# Patient Record
Sex: FEMALE | Race: BLACK OR AFRICAN AMERICAN | URBAN - METROPOLITAN AREA
[De-identification: names, ages, dates, MRNs, and addresses within clinical notes are randomized per-mention and may not be internally consistent; named-entity substitution may affect disease eponyms.]

---

## 2024-05-13 ENCOUNTER — OFFICE VISIT (OUTPATIENT)
Dept: ENT CLINIC | Age: 39
End: 2024-05-13
Payer: COMMERCIAL

## 2024-05-13 VITALS
HEIGHT: 67 IN | WEIGHT: 189 LBS | OXYGEN SATURATION: 98 % | BODY MASS INDEX: 29.66 KG/M2 | DIASTOLIC BLOOD PRESSURE: 77 MMHG | SYSTOLIC BLOOD PRESSURE: 112 MMHG | RESPIRATION RATE: 16 BRPM

## 2024-05-13 DIAGNOSIS — K11.8 PAROTID MASS: Primary | ICD-10-CM

## 2024-05-13 PROCEDURE — 99204 OFFICE O/P NEW MOD 45 MIN: CPT | Performed by: OTOLARYNGOLOGY

## 2024-05-13 PROCEDURE — 1036F TOBACCO NON-USER: CPT | Performed by: OTOLARYNGOLOGY

## 2024-05-13 PROCEDURE — G8427 DOCREV CUR MEDS BY ELIG CLIN: HCPCS | Performed by: OTOLARYNGOLOGY

## 2024-05-13 PROCEDURE — G8419 CALC BMI OUT NRM PARAM NOF/U: HCPCS | Performed by: OTOLARYNGOLOGY

## 2024-05-13 RX ORDER — CLINDAMYCIN HYDROCHLORIDE 300 MG/1
300 CAPSULE ORAL 3 TIMES DAILY
Qty: 21 CAPSULE | Refills: 0 | Status: SHIPPED | OUTPATIENT
Start: 2024-05-13 | End: 2024-05-20

## 2024-05-23 ENCOUNTER — HOSPITAL ENCOUNTER (OUTPATIENT)
Dept: ULTRASOUND IMAGING | Age: 39
Discharge: HOME | End: 2024-05-23
Attending: OTOLARYNGOLOGY
Payer: COMMERCIAL

## 2024-05-23 DIAGNOSIS — K11.8 PAROTID MASS: ICD-10-CM

## 2024-05-23 PROCEDURE — 88305 TISSUE EXAM BY PATHOLOGIST: CPT

## 2024-05-23 PROCEDURE — 76942 ECHO GUIDE FOR BIOPSY: CPT

## 2024-05-30 ENCOUNTER — TELEPHONE (OUTPATIENT)
Dept: ENT CLINIC | Age: 39
End: 2024-05-30

## 2024-05-30 NOTE — TELEPHONE ENCOUNTER
I called the patient to let her know that the FNA of the right facial lymph node was nondiagnostic.  I feel as though given the fact that its gotten smaller is probably just a lymph node that got inflamed.  I think at this time I would just want to see her in 3 months to make sure there is no changes.  Patient agrees.  I will see her then.

## 2024-05-31 ENCOUNTER — TELEPHONE (OUTPATIENT)
Dept: ENT CLINIC | Age: 39
End: 2024-05-31

## 2024-05-31 NOTE — TELEPHONE ENCOUNTER
----- Message from Benjamin Garcia MD sent at 5/30/2024 10:02 AM EDT -----  Regarding: follow up  I see this patient in 3 months for a follow-up visit.

## 2025-01-03 ENCOUNTER — APPOINTMENT (OUTPATIENT)
Dept: CT IMAGING | Age: 40
End: 2025-01-03

## 2025-01-03 ENCOUNTER — HOSPITAL ENCOUNTER (EMERGENCY)
Age: 40
Discharge: HOME | End: 2025-01-03

## 2025-01-03 VITALS
RESPIRATION RATE: 17 BRPM | SYSTOLIC BLOOD PRESSURE: 139 MMHG | TEMPERATURE: 98.1 F | DIASTOLIC BLOOD PRESSURE: 87 MMHG | HEART RATE: 51 BPM | OXYGEN SATURATION: 99 % | BODY MASS INDEX: 30.9 KG/M2 | WEIGHT: 197.31 LBS

## 2025-01-03 DIAGNOSIS — N20.1 URETEROLITHIASIS: Primary | ICD-10-CM

## 2025-01-03 LAB
ALBUMIN SERPL-MCNC: 4.5 G/DL (ref 3.4–5)
ALBUMIN/GLOB SERPL: 1.3 {RATIO} (ref 1.1–2.2)
ALP SERPL-CCNC: 54 U/L (ref 40–129)
ALT SERPL-CCNC: 33 U/L (ref 10–40)
AMPHETAMINES UR QL SCN>1000 NG/ML: ABNORMAL
ANION GAP SERPL CALCULATED.3IONS-SCNC: 15 MMOL/L (ref 3–16)
AST SERPL-CCNC: 25 U/L (ref 15–37)
BACTERIA URNS QL MICRO: ABNORMAL /HPF
BARBITURATES UR QL SCN>200 NG/ML: ABNORMAL
BASOPHILS # BLD: 0.1 K/UL (ref 0–0.2)
BASOPHILS NFR BLD: 0.9 %
BENZODIAZ UR QL SCN>200 NG/ML: ABNORMAL
BILIRUB SERPL-MCNC: 0.4 MG/DL (ref 0–1)
BILIRUB UR QL STRIP.AUTO: NEGATIVE
BUN SERPL-MCNC: 12 MG/DL (ref 7–20)
CALCIUM SERPL-MCNC: 9.7 MG/DL (ref 8.3–10.6)
CANNABINOIDS UR QL SCN>50 NG/ML: POSITIVE
CHARACTER UR: ABNORMAL
CHLORIDE SERPL-SCNC: 104 MMOL/L (ref 99–110)
CLARITY UR: ABNORMAL
CO2 SERPL-SCNC: 21 MMOL/L (ref 21–32)
COCAINE UR QL SCN: ABNORMAL
COLOR UR: YELLOW
CREAT SERPL-MCNC: 0.8 MG/DL (ref 0.6–1.1)
DEPRECATED RDW RBC AUTO: 14.7 % (ref 12.4–15.4)
DRUG SCREEN COMMENT UR-IMP: ABNORMAL
EKG ATRIAL RATE: 53 BPM
EKG DIAGNOSIS: NORMAL
EKG P-R INTERVAL: 128 MS
EKG Q-T INTERVAL: 480 MS
EKG QRS DURATION: 88 MS
EKG QTC CALCULATION (BAZETT): 450 MS
EKG R AXIS: 13 DEGREES
EKG T AXIS: 20 DEGREES
EKG VENTRICULAR RATE: 53 BPM
EOSINOPHIL # BLD: 0.1 K/UL (ref 0–0.6)
EOSINOPHIL NFR BLD: 1.1 %
EPI CELLS #/AREA URNS AUTO: 2 /HPF (ref 0–5)
FENTANYL SCREEN, URINE: ABNORMAL
GFR SERPLBLD CREATININE-BSD FMLA CKD-EPI: >90 ML/MIN/{1.73_M2}
GLUCOSE SERPL-MCNC: 136 MG/DL (ref 70–99)
GLUCOSE UR STRIP.AUTO-MCNC: NEGATIVE MG/DL
HCT VFR BLD AUTO: 42.2 % (ref 36–48)
HGB BLD-MCNC: 13.8 G/DL (ref 12–16)
HGB UR QL STRIP.AUTO: ABNORMAL
HYALINE CASTS #/AREA URNS AUTO: 0 /LPF (ref 0–8)
KETONES UR STRIP.AUTO-MCNC: ABNORMAL MG/DL
LEUKOCYTE ESTERASE UR QL STRIP.AUTO: ABNORMAL
LIPASE SERPL-CCNC: 15 U/L (ref 13–60)
LYMPHOCYTES # BLD: 2.7 K/UL (ref 1–5.1)
LYMPHOCYTES NFR BLD: 29.1 %
MAGNESIUM SERPL-MCNC: 1.82 MG/DL (ref 1.8–2.4)
MCH RBC QN AUTO: 27.7 PG (ref 26–34)
MCHC RBC AUTO-ENTMCNC: 32.6 G/DL (ref 31–36)
MCV RBC AUTO: 84.8 FL (ref 80–100)
METHADONE UR QL SCN>300 NG/ML: ABNORMAL
MONOCYTES # BLD: 0.5 K/UL (ref 0–1.3)
MONOCYTES NFR BLD: 5 %
NEUTROPHILS # BLD: 6 K/UL (ref 1.7–7.7)
NEUTROPHILS NFR BLD: 63.9 %
NITRITE UR QL STRIP.AUTO: NEGATIVE
OPIATES UR QL SCN>300 NG/ML: ABNORMAL
OXYCODONE UR QL SCN: ABNORMAL
PCP UR QL SCN>25 NG/ML: ABNORMAL
PH UR STRIP.AUTO: 8.5 [PH] (ref 5–8)
PH UR STRIP: 8 [PH]
PLATELET # BLD AUTO: 288 K/UL (ref 135–450)
PLATELET BLD QL SMEAR: ADEQUATE
PMV BLD AUTO: 9.3 FL (ref 5–10.5)
POTASSIUM SERPL-SCNC: 3.5 MMOL/L (ref 3.5–5.1)
PROT SERPL-MCNC: 7.9 G/DL (ref 6.4–8.2)
PROT UR STRIP.AUTO-MCNC: ABNORMAL MG/DL
RBC # BLD AUTO: 4.97 M/UL (ref 4–5.2)
RBC #/AREA URNS HPF: ABNORMAL /HPF (ref 0–4)
SLIDE REVIEW: NORMAL
SODIUM SERPL-SCNC: 140 MMOL/L (ref 136–145)
SP GR UR STRIP.AUTO: 1.02 (ref 1–1.03)
UA COMPLETE W REFLEX CULTURE PNL UR: ABNORMAL
UA DIPSTICK W REFLEX MICRO PNL UR: YES
URN SPEC COLLECT METH UR: ABNORMAL
UROBILINOGEN UR STRIP-ACNC: 0.2 E.U./DL
WBC # BLD AUTO: 9.4 K/UL (ref 4–11)
WBC #/AREA URNS AUTO: 3 /HPF (ref 0–5)

## 2025-01-03 PROCEDURE — 83690 ASSAY OF LIPASE: CPT

## 2025-01-03 PROCEDURE — 96374 THER/PROPH/DIAG INJ IV PUSH: CPT

## 2025-01-03 PROCEDURE — 6370000000 HC RX 637 (ALT 250 FOR IP): Performed by: PHYSICIAN ASSISTANT

## 2025-01-03 PROCEDURE — 99285 EMERGENCY DEPT VISIT HI MDM: CPT

## 2025-01-03 PROCEDURE — 80307 DRUG TEST PRSMV CHEM ANLYZR: CPT

## 2025-01-03 PROCEDURE — 6360000004 HC RX CONTRAST MEDICATION: Performed by: PHYSICIAN ASSISTANT

## 2025-01-03 PROCEDURE — 83735 ASSAY OF MAGNESIUM: CPT

## 2025-01-03 PROCEDURE — 93005 ELECTROCARDIOGRAM TRACING: CPT | Performed by: EMERGENCY MEDICINE

## 2025-01-03 PROCEDURE — 2580000003 HC RX 258: Performed by: PHYSICIAN ASSISTANT

## 2025-01-03 PROCEDURE — 6360000002 HC RX W HCPCS: Performed by: PHYSICIAN ASSISTANT

## 2025-01-03 PROCEDURE — 81001 URINALYSIS AUTO W/SCOPE: CPT

## 2025-01-03 PROCEDURE — 85025 COMPLETE CBC W/AUTO DIFF WBC: CPT

## 2025-01-03 PROCEDURE — 80053 COMPREHEN METABOLIC PANEL: CPT

## 2025-01-03 PROCEDURE — 96375 TX/PRO/DX INJ NEW DRUG ADDON: CPT

## 2025-01-03 PROCEDURE — 74177 CT ABD & PELVIS W/CONTRAST: CPT

## 2025-01-03 PROCEDURE — 93010 ELECTROCARDIOGRAM REPORT: CPT | Performed by: INTERNAL MEDICINE

## 2025-01-03 RX ORDER — ONDANSETRON 4 MG/1
4 TABLET, ORALLY DISINTEGRATING ORAL EVERY 8 HOURS PRN
Qty: 10 TABLET | Refills: 0 | Status: SHIPPED | OUTPATIENT
Start: 2025-01-03

## 2025-01-03 RX ORDER — IBUPROFEN 800 MG/1
800 TABLET, FILM COATED ORAL EVERY 8 HOURS PRN
Qty: 20 TABLET | Refills: 0 | Status: SHIPPED | OUTPATIENT
Start: 2025-01-03

## 2025-01-03 RX ORDER — 0.9 % SODIUM CHLORIDE 0.9 %
1000 INTRAVENOUS SOLUTION INTRAVENOUS ONCE
Status: COMPLETED | OUTPATIENT
Start: 2025-01-03 | End: 2025-01-03

## 2025-01-03 RX ORDER — KETOROLAC TROMETHAMINE 15 MG/ML
15 INJECTION, SOLUTION INTRAMUSCULAR; INTRAVENOUS ONCE
Status: COMPLETED | OUTPATIENT
Start: 2025-01-03 | End: 2025-01-03

## 2025-01-03 RX ORDER — TAMSULOSIN HYDROCHLORIDE 0.4 MG/1
0.4 CAPSULE ORAL ONCE
Status: COMPLETED | OUTPATIENT
Start: 2025-01-03 | End: 2025-01-03

## 2025-01-03 RX ORDER — OXYCODONE AND ACETAMINOPHEN 5; 325 MG/1; MG/1
2 TABLET ORAL ONCE
Status: COMPLETED | OUTPATIENT
Start: 2025-01-03 | End: 2025-01-03

## 2025-01-03 RX ORDER — MORPHINE SULFATE 4 MG/ML
4 INJECTION, SOLUTION INTRAMUSCULAR; INTRAVENOUS ONCE
Status: COMPLETED | OUTPATIENT
Start: 2025-01-03 | End: 2025-01-03

## 2025-01-03 RX ORDER — IOPAMIDOL 755 MG/ML
75 INJECTION, SOLUTION INTRAVASCULAR
Status: COMPLETED | OUTPATIENT
Start: 2025-01-03 | End: 2025-01-03

## 2025-01-03 RX ORDER — DROPERIDOL 2.5 MG/ML
1.25 INJECTION, SOLUTION INTRAMUSCULAR; INTRAVENOUS ONCE
Status: COMPLETED | OUTPATIENT
Start: 2025-01-03 | End: 2025-01-03

## 2025-01-03 RX ORDER — OXYCODONE AND ACETAMINOPHEN 5; 325 MG/1; MG/1
1 TABLET ORAL EVERY 6 HOURS PRN
Qty: 12 TABLET | Refills: 0 | Status: SHIPPED | OUTPATIENT
Start: 2025-01-03 | End: 2025-01-06

## 2025-01-03 RX ORDER — TAMSULOSIN HYDROCHLORIDE 0.4 MG/1
0.4 CAPSULE ORAL NIGHTLY
Qty: 7 CAPSULE | Refills: 0 | Status: SHIPPED | OUTPATIENT
Start: 2025-01-03

## 2025-01-03 RX ADMIN — OXYCODONE HYDROCHLORIDE AND ACETAMINOPHEN 2 TABLET: 5; 325 TABLET ORAL at 15:14

## 2025-01-03 RX ADMIN — DROPERIDOL 1.25 MG: 2.5 INJECTION, SOLUTION INTRAMUSCULAR; INTRAVENOUS at 12:30

## 2025-01-03 RX ADMIN — KETOROLAC TROMETHAMINE 15 MG: 15 INJECTION, SOLUTION INTRAMUSCULAR; INTRAVENOUS at 12:30

## 2025-01-03 RX ADMIN — TAMSULOSIN HYDROCHLORIDE 0.4 MG: 0.4 CAPSULE ORAL at 15:14

## 2025-01-03 RX ADMIN — SODIUM CHLORIDE 1000 ML: 9 INJECTION, SOLUTION INTRAVENOUS at 12:31

## 2025-01-03 RX ADMIN — MORPHINE SULFATE 4 MG: 4 INJECTION, SOLUTION INTRAMUSCULAR; INTRAVENOUS at 14:09

## 2025-01-03 RX ADMIN — IOPAMIDOL 75 ML: 755 INJECTION, SOLUTION INTRAVENOUS at 13:00

## 2025-01-03 ASSESSMENT — PAIN - FUNCTIONAL ASSESSMENT
PAIN_FUNCTIONAL_ASSESSMENT: NONE - DENIES PAIN
PAIN_FUNCTIONAL_ASSESSMENT: 0-10

## 2025-01-03 ASSESSMENT — PAIN DESCRIPTION - LOCATION
LOCATION: ABDOMEN
LOCATION: ABDOMEN

## 2025-01-03 ASSESSMENT — PAIN DESCRIPTION - DESCRIPTORS
DESCRIPTORS: ACHING
DESCRIPTORS: PATIENT UNABLE TO DESCRIBE

## 2025-01-03 ASSESSMENT — PAIN DESCRIPTION - ORIENTATION: ORIENTATION: LOWER

## 2025-01-03 ASSESSMENT — PAIN SCALES - GENERAL
PAINLEVEL_OUTOF10: 8
PAINLEVEL_OUTOF10: 10
PAINLEVEL_OUTOF10: 10

## 2025-01-03 ASSESSMENT — PAIN DESCRIPTION - PAIN TYPE: TYPE: ACUTE PAIN

## 2025-01-03 NOTE — ED NOTES
Tech informed RN he went in to patient room and patient states \"my iv is coming out, see?\" And states he watched patient at that moment pull back RN's securing dressing and original dressing and pull iv out.

## 2025-01-03 NOTE — ED PROVIDER NOTES
The Ekg interpreted by me shows  Sinus bradycardia with a rate of 53  Axis is normal   QTc is acceptable at 450  Intervals and Durations are unremarkable.      ST Segments: nonspecific changes. No prior EKG available for comparison    I did not see or evaluate this patient. This is ekg interpretation only.         Kelle Santos MD  01/03/25 5779

## 2025-01-03 NOTE — ED PROVIDER NOTES
[facility] EMERGENCY DEPARTMENT  EMERGENCY DEPARTMENT ENCOUNTER        Pt Name: Argentina Villagomez  MRN: [id number]  Birthdate 1985  Date of evaluation: 1/3/2025  Provider: Lindsey Ware PA-C  PCP: Alberta Oh APRN - NP  Note Started: 11:20 AM EST 1/3/25      ARON. I have evaluated this patient.        CHIEF COMPLAINT       Chief Complaint   Patient presents with    Abdominal Pain     C/o abd pain that shoots around to her back with nausea, green emesis. Denies diarrhea.        HISTORY OF PRESENT ILLNESS: 1 or more Elements     History From: patient, limited hx from patient due to her distress due to pain    Argentina Villagomez is a 39 y.o. female who presents for abdominal pain, nausea and vomiting.  Pain located throughout her abdomen.  Has associated nausea and vomiting.  Denies hematemesis or coffee ground emesis.  Denies diarrhea.  No prior episodes of pain.  Smokes MJ daily.  No other drugs.  No hx of cannabinoid hyperemesis.    Nursing Notes were reviewed and agreed with or any disagreements were addressed in the HPI.    REVIEW OF SYSTEMS :      Review of Systems    Positives and Pertinent negatives as per HPI.     SURGICAL HISTORY     Past Surgical History:   Procedure Laterality Date     SECTION  ,     HYSTERECTOMY (CERVIX STATUS UNKNOWN)      LAPAROTOMY  2009    \"mass\"    TONSILLECTOMY  1988     BIOPSY OF SALIVARY GLAND  2024     BIOPSY OF SALIVARY GLAND 2024 Vicki Solorio, APRN - CNP [facility] ULTRASOUND       CURRENTMEDICATIONS       Discharge Medication List as of 1/3/2025  3:03 PM          ALLERGIES     Pcn [penicillins], Ambien [zolpidem], and Amoxicillin    FAMILYHISTORY     History reviewed. No pertinent family history.     SOCIAL HISTORY       Social History     Tobacco Use    Smoking status: Former     Current packs/day: 0.30     Average packs/day: 0.3 packs/day for 1 year (0.3 ttl pk-yrs)     Types: Cigarettes

## 2025-01-03 NOTE — ED NOTES
RN unavailable but could hear patient screaming, patient was not screaming when brought to room, walking to bathroom and back or while talking with provider, patient started screaming her iv was coming out. Rn went to room and explained with a critical patient and I understand she is in pain but unable to be in room and will be there as soon as I can. Rn secured iv with another dressing and stated I will check on it as soon as I can get in. Patient states she is going to continue to scream.

## 2025-04-11 ENCOUNTER — HOSPITAL ENCOUNTER (EMERGENCY)
Age: 40
Discharge: HOME | End: 2025-04-11
Attending: STUDENT IN AN ORGANIZED HEALTH CARE EDUCATION/TRAINING PROGRAM
Payer: COMMERCIAL

## 2025-04-11 ENCOUNTER — APPOINTMENT (OUTPATIENT)
Dept: GENERAL RADIOLOGY | Age: 40
End: 2025-04-11
Payer: COMMERCIAL

## 2025-04-11 VITALS
DIASTOLIC BLOOD PRESSURE: 70 MMHG | HEART RATE: 52 BPM | RESPIRATION RATE: 18 BRPM | OXYGEN SATURATION: 100 % | TEMPERATURE: 97.8 F | SYSTOLIC BLOOD PRESSURE: 156 MMHG | BODY MASS INDEX: 29.76 KG/M2 | WEIGHT: 190 LBS

## 2025-04-11 DIAGNOSIS — S86.812A RUPTURE OF LEFT PATELLAR TENDON, INITIAL ENCOUNTER: Primary | ICD-10-CM

## 2025-04-11 PROCEDURE — 99284 EMERGENCY DEPT VISIT MOD MDM: CPT

## 2025-04-11 PROCEDURE — 96372 THER/PROPH/DIAG INJ SC/IM: CPT

## 2025-04-11 PROCEDURE — 6360000002 HC RX W HCPCS: Performed by: STUDENT IN AN ORGANIZED HEALTH CARE EDUCATION/TRAINING PROGRAM

## 2025-04-11 PROCEDURE — 73562 X-RAY EXAM OF KNEE 3: CPT

## 2025-04-11 RX ORDER — KETOROLAC TROMETHAMINE 15 MG/ML
15 INJECTION, SOLUTION INTRAMUSCULAR; INTRAVENOUS ONCE
Status: COMPLETED | OUTPATIENT
Start: 2025-04-11 | End: 2025-04-11

## 2025-04-11 RX ORDER — MELOXICAM 15 MG/1
15 TABLET ORAL DAILY
Qty: 7 TABLET | Refills: 0 | Status: ON HOLD | OUTPATIENT
Start: 2025-04-11

## 2025-04-11 RX ADMIN — KETOROLAC TROMETHAMINE 15 MG: 15 INJECTION, SOLUTION INTRAMUSCULAR; INTRAVENOUS at 18:30

## 2025-04-11 ASSESSMENT — PAIN SCALES - GENERAL: PAINLEVEL_OUTOF10: 10

## 2025-04-11 ASSESSMENT — PAIN DESCRIPTION - DESCRIPTORS: DESCRIPTORS: ACHING

## 2025-04-11 ASSESSMENT — PAIN DESCRIPTION - LOCATION: LOCATION: KNEE

## 2025-04-11 ASSESSMENT — PAIN - FUNCTIONAL ASSESSMENT: PAIN_FUNCTIONAL_ASSESSMENT: 0-10

## 2025-04-11 ASSESSMENT — PAIN DESCRIPTION - ORIENTATION: ORIENTATION: LEFT

## 2025-04-11 NOTE — ED PROVIDER NOTES
judgement.        DIAGNOSTIC RESULTS     RADIOLOGY   Interpretation per the Radiologist below, if available at the time of this note:  XR KNEE LEFT (3 VIEWS)  Result Date: 4/11/2025  XR KNEE LEFT (3 VIEWS) REASON FOR EXAM: Fall going down stairs w/ medial pain and popping sensation COMPARISON: March 8, 2024 FINDINGS: AP, lateral, and sunrise views of the demonstrate a high riding patella, more prominent than prior study from 2024. No fracture or dislocation. No joint effusion.     High riding patella, suspicious for potential patellar tendon injury. Correlate with physical exam. Electronically signed by Boris Bonds MD      ED BEDSIDE ULTRASOUND:   Performed by ED Physician -none    LABS:  Labs Reviewed - No data to display     All other labs were within normal range or not returned as of this dictation.    EMERGENCY DEPARTMENT COURSE and DIFFERENTIAL DIAGNOSIS/MDM:   Vitals:    Vitals:    04/11/25 1800   BP: (!) 156/70   Pulse: 52   Resp: 18   Temp: 97.8 °F (36.6 °C)   TempSrc: Oral   SpO2: 100%   Weight: 86.2 kg (190 lb)       The patient is a 39 y.o. female who presented with a chief complaint of knee pain as above. The differential diagnosis associated with this patient's presentation includes but is not limited to ligamentous injury, patellar fracture or dislocation, tendon injury, considered knee dislocation however not consistent with exam findings. Our workup consisted of ordering/reviewing x-ray of the left knee which confirmed high riding patella consistent with exam findings of elevated patella on exam and inability to extend her knee consistent with patellar tendon rupture.  Patient will be placed in a knee immobilizer and require close outpatient follow-up with orthopedic surgery.  She will be nonweightbearing and provided crutches.    Medications provided in the ED and prescribed at discharge are listed below.     ED Course as of 04/11/25 1919 Fri Apr 11, 2025 1859 All findings discussed with the  patient at bedside, all questions answered and concerns addressed.  On reevaluation patient is sitting up in bed in no acute distress she has even unlabored respirations.  She states no new symptoms.  I discussed the need for knee immobilizer and close follow-up with orthopedic surgery as she will likely require surgical correction of her patellar tendon rupture.  Patient stated understanding and understanding of nonweightbearing status. [DG]      ED Course User Index  [DG] Larry Chisholm MD           ED Medications managed:  Medications   ketorolac (TORADOL) injection 15 mg (15 mg IntraMUSCular Given 4/11/25 6730)       PROCEDURES:  Unless otherwise noted below, none.     Procedures    FINAL IMPRESSION      1. Rupture of left patellar tendon, initial encounter          DISPOSITION    Discharge - Pending Orders Complete 04/11/2025 07:14:33 PM      PATIENT REFERRED TO:  Marcus Hsu MD  [address], [address]  [place] [id number] [phone]    Schedule an appointment as soon as possible for a visit       [facility] Emergency Department  [address] [place] [address]  [phone]    If symptoms worsen      DISCHARGE MEDICATIONS:  New Prescriptions    MELOXICAM (MOBIC) 15 MG TABLET    Take 1 tablet by mouth daily        (Comment: Please note this report has been produced using speech recognition software and may contain errors related to that system including errors in grammar, punctuation, and spelling, as well as words and phrases that may be inappropriate.  If there are any questions or concerns please feel free to contact the dictating provider for clarification.)    LARRY CHISHOLM MD (electronically signed)  Emergency Medicine Provider        Larry Chisholm MD  04/11/25 4052

## 2025-04-11 NOTE — DISCHARGE INSTRUCTIONS
Thank you for trusting us with your care, it was nice to meet you, as we discussed leave the knee immobilizer on until you are seen by orthopedic surgery.  Do not bear weight on your left leg.  Use the crutches as instructed.  Call first thing Monday to schedule an appointment as soon as possible with orthopedic surgery.  Return to the emergency department if you lose sensation or have numbness in your leg, or other symptoms you find concerning for emergent illness or injury.

## 2025-04-12 NOTE — ED NOTES
Pt ambulatory out of ER;   Unable to sit in wheelchair due to knee immobilizer; No leg support on wheelchairs.   Pt ambulatory with help by friends/family.

## 2025-04-14 ENCOUNTER — HOSPITAL ENCOUNTER (EMERGENCY)
Age: 40
Discharge: ANOTHER HOSPITAL | End: 2025-04-14
Attending: STUDENT IN AN ORGANIZED HEALTH CARE EDUCATION/TRAINING PROGRAM
Payer: COMMERCIAL

## 2025-04-14 ENCOUNTER — APPOINTMENT (OUTPATIENT)
Age: 40
End: 2025-04-14
Attending: ORTHOPAEDIC SURGERY
Payer: COMMERCIAL

## 2025-04-14 ENCOUNTER — HOSPITAL ENCOUNTER (INPATIENT)
Age: 40
LOS: 11 days | Discharge: NURSING, REHAB OR LONG-TERM CARE | End: 2025-04-25
Attending: ORTHOPAEDIC SURGERY | Admitting: ORTHOPAEDIC SURGERY
Payer: COMMERCIAL

## 2025-04-14 ENCOUNTER — ANESTHESIA EVENT (OUTPATIENT)
Age: 40
End: 2025-04-14
Payer: COMMERCIAL

## 2025-04-14 ENCOUNTER — TELEPHONE (OUTPATIENT)
Age: 40
End: 2025-04-14

## 2025-04-14 VITALS
HEART RATE: 74 BPM | SYSTOLIC BLOOD PRESSURE: 163 MMHG | RESPIRATION RATE: 19 BRPM | DIASTOLIC BLOOD PRESSURE: 99 MMHG | TEMPERATURE: 98.5 F | OXYGEN SATURATION: 97 %

## 2025-04-14 DIAGNOSIS — S86.812A PATELLAR TENDON RUPTURE, LEFT, INITIAL ENCOUNTER: Primary | ICD-10-CM

## 2025-04-14 DIAGNOSIS — S86.812A RUPTURE OF LEFT PATELLAR TENDON, INITIAL ENCOUNTER: Primary | ICD-10-CM

## 2025-04-14 LAB
ALBUMIN SERPL-MCNC: 4.2 G/DL (ref 3.4–5)
ALBUMIN/GLOB SERPL: 1.3 {RATIO} (ref 1.1–2.2)
ALP SERPL-CCNC: 54 U/L (ref 40–129)
ALT SERPL-CCNC: 22 U/L (ref 10–40)
ANION GAP SERPL CALCULATED.3IONS-SCNC: 11 MMOL/L (ref 3–16)
ANION GAP SERPL CALCULATED.3IONS-SCNC: 11 MMOL/L (ref 3–16)
APTT BLD: 29 SEC (ref 22.1–36.4)
AST SERPL-CCNC: 18 U/L (ref 15–37)
BASOPHILS # BLD: 0.03 K/UL (ref 0–0.2)
BASOPHILS # BLD: 0.1 K/UL (ref 0–0.2)
BASOPHILS NFR BLD: 0 %
BASOPHILS NFR BLD: 1.1 %
BILIRUB SERPL-MCNC: 0.3 MG/DL (ref 0–1)
BUN SERPL-MCNC: 13 MG/DL (ref 7–20)
BUN SERPL-MCNC: 15 MG/DL (ref 7–20)
CALCIUM SERPL-MCNC: 8.8 MG/DL (ref 8.3–10.6)
CALCIUM SERPL-MCNC: 9.4 MG/DL (ref 8.3–10.6)
CHLORIDE SERPL-SCNC: 105 MMOL/L (ref 99–110)
CHLORIDE SERPL-SCNC: 106 MMOL/L (ref 99–110)
CO2 SERPL-SCNC: 22 MMOL/L (ref 21–32)
CO2 SERPL-SCNC: 23 MMOL/L (ref 21–32)
CREAT SERPL-MCNC: 0.6 MG/DL (ref 0.5–1)
CREAT SERPL-MCNC: 0.6 MG/DL (ref 0.6–1.1)
DEPRECATED RDW RBC AUTO: 14.7 % (ref 12.4–15.4)
EOSINOPHIL # BLD: 0.1 K/UL (ref 0–0.6)
EOSINOPHIL # BLD: 0.15 K/UL (ref 0–0.6)
EOSINOPHIL NFR BLD: 1.8 %
EOSINOPHILS RELATIVE PERCENT: 2 %
ERYTHROCYTE [DISTWIDTH] IN BLOOD BY AUTOMATED COUNT: 14.1 % (ref 12.4–15.4)
GFR SERPLBLD CREATININE-BSD FMLA CKD-EPI: >90 ML/MIN/{1.73_M2}
GFR, ESTIMATED: >90 ML/MIN/1.73M2
GLUCOSE SERPL-MCNC: 133 MG/DL (ref 70–99)
GLUCOSE SERPL-MCNC: 91 MG/DL (ref 70–99)
HCT VFR BLD AUTO: 37.6 % (ref 36–48)
HCT VFR BLD AUTO: 41.1 % (ref 36–48)
HGB BLD-MCNC: 12.5 G/DL (ref 12–16)
HGB BLD-MCNC: 13.4 G/DL (ref 12–16)
IMM GRANULOCYTES # BLD AUTO: 0.01 K/UL (ref 0–0.5)
IMM GRANULOCYTES NFR BLD: 0 %
INR PPP: 0.97 (ref 0.85–1.15)
LYMPHOCYTES # BLD: 2.7 K/UL (ref 1–5.1)
LYMPHOCYTES NFR BLD: 3.79 K/UL (ref 1–5.1)
LYMPHOCYTES NFR BLD: 38.8 %
LYMPHOCYTES RELATIVE PERCENT: 45 %
MCH RBC QN AUTO: 27.3 PG (ref 26–34)
MCH RBC QN AUTO: 27.6 PG (ref 26–34)
MCHC RBC AUTO-ENTMCNC: 32.6 G/DL (ref 31–36)
MCHC RBC AUTO-ENTMCNC: 33.2 G/DL (ref 31–36)
MCV RBC AUTO: 82.1 FL (ref 80–100)
MCV RBC AUTO: 84.5 FL (ref 80–100)
MONOCYTES # BLD: 0.4 K/UL (ref 0–1.3)
MONOCYTES NFR BLD: 0.64 K/UL (ref 0–1.3)
MONOCYTES NFR BLD: 6.2 %
MONOCYTES NFR BLD: 8 %
NEUTROPHILS # BLD: 3.6 K/UL (ref 1.7–7.7)
NEUTROPHILS NFR BLD: 45 %
NEUTROPHILS NFR BLD: 52.1 %
NEUTS SEG NFR BLD: 3.81 K/UL (ref 1.7–7.7)
PLATELET # BLD AUTO: 251 K/UL (ref 135–450)
PLATELET # BLD AUTO: 267 K/UL (ref 135–450)
PMV BLD AUTO: 10.2 FL
PMV BLD AUTO: 9.1 FL (ref 5–10.5)
POTASSIUM SERPL-SCNC: 3.9 MMOL/L (ref 3.5–5.1)
POTASSIUM SERPL-SCNC: 4 MMOL/L (ref 3.5–5.1)
PROT SERPL-MCNC: 7.4 G/DL (ref 6.4–8.2)
PROTHROMBIN TIME: 13.1 SEC (ref 11.9–14.9)
RBC # BLD AUTO: 4.58 M/UL (ref 4–5.2)
RBC # BLD AUTO: 4.87 M/UL (ref 4–5.2)
SODIUM SERPL-SCNC: 139 MMOL/L (ref 136–145)
SODIUM SERPL-SCNC: 139 MMOL/L (ref 136–145)
WBC # BLD AUTO: 7 K/UL (ref 4–11)
WBC OTHER # BLD: 8.4 K/UL (ref 4–11)

## 2025-04-14 PROCEDURE — 99223 1ST HOSP IP/OBS HIGH 75: CPT | Performed by: ORTHOPAEDIC SURGERY

## 2025-04-14 PROCEDURE — 96374 THER/PROPH/DIAG INJ IV PUSH: CPT

## 2025-04-14 PROCEDURE — 85025 COMPLETE CBC W/AUTO DIFF WBC: CPT

## 2025-04-14 PROCEDURE — 85610 PROTHROMBIN TIME: CPT

## 2025-04-14 PROCEDURE — 6370000000 HC RX 637 (ALT 250 FOR IP): Performed by: ORTHOPAEDIC SURGERY

## 2025-04-14 PROCEDURE — 1200000000 HC SEMI PRIVATE

## 2025-04-14 PROCEDURE — 6360000002 HC RX W HCPCS: Performed by: ORTHOPAEDIC SURGERY

## 2025-04-14 PROCEDURE — 36415 COLL VENOUS BLD VENIPUNCTURE: CPT

## 2025-04-14 PROCEDURE — 6360000002 HC RX W HCPCS: Performed by: STUDENT IN AN ORGANIZED HEALTH CARE EDUCATION/TRAINING PROGRAM

## 2025-04-14 PROCEDURE — 85730 THROMBOPLASTIN TIME PARTIAL: CPT

## 2025-04-14 PROCEDURE — 73721 MRI JNT OF LWR EXTRE W/O DYE: CPT

## 2025-04-14 PROCEDURE — 2580000003 HC RX 258: Performed by: ORTHOPAEDIC SURGERY

## 2025-04-14 PROCEDURE — 99285 EMERGENCY DEPT VISIT HI MDM: CPT

## 2025-04-14 PROCEDURE — 2500000003 HC RX 250 WO HCPCS: Performed by: ORTHOPAEDIC SURGERY

## 2025-04-14 PROCEDURE — 80048 BASIC METABOLIC PNL TOTAL CA: CPT

## 2025-04-14 PROCEDURE — 80053 COMPREHEN METABOLIC PANEL: CPT

## 2025-04-14 RX ORDER — SODIUM CHLORIDE 0.9 % (FLUSH) 0.9 %
5-40 SYRINGE (ML) INJECTION EVERY 12 HOURS SCHEDULED
Status: DISCONTINUED | OUTPATIENT
Start: 2025-04-14 | End: 2025-04-15 | Stop reason: HOSPADM

## 2025-04-14 RX ORDER — KETOROLAC TROMETHAMINE 30 MG/ML
30 INJECTION, SOLUTION INTRAMUSCULAR; INTRAVENOUS EVERY 6 HOURS PRN
Status: DISPENSED | OUTPATIENT
Start: 2025-04-14 | End: 2025-04-19

## 2025-04-14 RX ORDER — OXYCODONE HYDROCHLORIDE 5 MG/1
5 TABLET ORAL EVERY 4 HOURS PRN
Status: DISCONTINUED | OUTPATIENT
Start: 2025-04-14 | End: 2025-04-24

## 2025-04-14 RX ORDER — TRANEXAMIC ACID 10 MG/ML
1000 INJECTION, SOLUTION INTRAVENOUS
Status: ACTIVE | OUTPATIENT
Start: 2025-04-15 | End: 2025-04-15

## 2025-04-14 RX ORDER — OXYCODONE HYDROCHLORIDE 5 MG/1
10 TABLET ORAL EVERY 4 HOURS PRN
Status: DISCONTINUED | OUTPATIENT
Start: 2025-04-14 | End: 2025-04-24

## 2025-04-14 RX ORDER — ACETAMINOPHEN 325 MG/1
650 TABLET ORAL EVERY 6 HOURS PRN
Status: DISCONTINUED | OUTPATIENT
Start: 2025-04-14 | End: 2025-04-24

## 2025-04-14 RX ORDER — SODIUM CHLORIDE 9 MG/ML
INJECTION, SOLUTION INTRAVENOUS CONTINUOUS
Status: DISCONTINUED | OUTPATIENT
Start: 2025-04-14 | End: 2025-04-15 | Stop reason: HOSPADM

## 2025-04-14 RX ORDER — POLYETHYLENE GLYCOL 3350 17 G/17G
17 POWDER, FOR SOLUTION ORAL DAILY PRN
Status: DISCONTINUED | OUTPATIENT
Start: 2025-04-14 | End: 2025-04-25 | Stop reason: HOSPADM

## 2025-04-14 RX ORDER — MAGNESIUM SULFATE IN WATER 40 MG/ML
2000 INJECTION, SOLUTION INTRAVENOUS PRN
Status: DISCONTINUED | OUTPATIENT
Start: 2025-04-14 | End: 2025-04-25 | Stop reason: HOSPADM

## 2025-04-14 RX ORDER — SODIUM CHLORIDE 0.9 % (FLUSH) 0.9 %
5-40 SYRINGE (ML) INJECTION PRN
Status: DISCONTINUED | OUTPATIENT
Start: 2025-04-14 | End: 2025-04-15 | Stop reason: HOSPADM

## 2025-04-14 RX ORDER — MORPHINE SULFATE 2 MG/ML
2 INJECTION, SOLUTION INTRAMUSCULAR; INTRAVENOUS ONCE
Refills: 0 | Status: COMPLETED | OUTPATIENT
Start: 2025-04-14 | End: 2025-04-14

## 2025-04-14 RX ORDER — POTASSIUM CHLORIDE 1500 MG/1
40 TABLET, EXTENDED RELEASE ORAL PRN
Status: DISCONTINUED | OUTPATIENT
Start: 2025-04-14 | End: 2025-04-25 | Stop reason: HOSPADM

## 2025-04-14 RX ORDER — HYDROXYZINE PAMOATE 25 MG/1
25 CAPSULE ORAL ONCE
Status: DISCONTINUED | OUTPATIENT
Start: 2025-04-14 | End: 2025-04-25 | Stop reason: HOSPADM

## 2025-04-14 RX ORDER — POTASSIUM CHLORIDE 7.45 MG/ML
10 INJECTION INTRAVENOUS PRN
Status: DISCONTINUED | OUTPATIENT
Start: 2025-04-14 | End: 2025-04-25 | Stop reason: HOSPADM

## 2025-04-14 RX ORDER — ACETAMINOPHEN 650 MG/1
650 SUPPOSITORY RECTAL EVERY 6 HOURS PRN
Status: DISCONTINUED | OUTPATIENT
Start: 2025-04-14 | End: 2025-04-24

## 2025-04-14 RX ORDER — ONDANSETRON 2 MG/ML
4 INJECTION INTRAMUSCULAR; INTRAVENOUS EVERY 6 HOURS PRN
Status: DISCONTINUED | OUTPATIENT
Start: 2025-04-14 | End: 2025-04-25 | Stop reason: HOSPADM

## 2025-04-14 RX ORDER — ENOXAPARIN SODIUM 100 MG/ML
40 INJECTION SUBCUTANEOUS DAILY
Status: DISCONTINUED | OUTPATIENT
Start: 2025-04-14 | End: 2025-04-15

## 2025-04-14 RX ORDER — ONDANSETRON 4 MG/1
4 TABLET, ORALLY DISINTEGRATING ORAL EVERY 8 HOURS PRN
Status: DISCONTINUED | OUTPATIENT
Start: 2025-04-14 | End: 2025-04-25 | Stop reason: HOSPADM

## 2025-04-14 RX ORDER — SODIUM CHLORIDE 9 MG/ML
INJECTION, SOLUTION INTRAVENOUS PRN
Status: DISCONTINUED | OUTPATIENT
Start: 2025-04-14 | End: 2025-04-15 | Stop reason: HOSPADM

## 2025-04-14 RX ADMIN — HYDROMORPHONE HYDROCHLORIDE 0.5 MG: 1 INJECTION, SOLUTION INTRAMUSCULAR; INTRAVENOUS; SUBCUTANEOUS at 12:33

## 2025-04-14 RX ADMIN — SODIUM CHLORIDE: 0.9 INJECTION, SOLUTION INTRAVENOUS at 15:31

## 2025-04-14 RX ADMIN — ONDANSETRON 4 MG: 2 INJECTION, SOLUTION INTRAMUSCULAR; INTRAVENOUS at 17:45

## 2025-04-14 RX ADMIN — OXYCODONE HYDROCHLORIDE 10 MG: 5 TABLET ORAL at 21:49

## 2025-04-14 RX ADMIN — MORPHINE SULFATE 2 MG: 2 INJECTION, SOLUTION INTRAMUSCULAR; INTRAVENOUS at 10:47

## 2025-04-14 RX ADMIN — KETOROLAC TROMETHAMINE 30 MG: 30 INJECTION, SOLUTION INTRAMUSCULAR at 15:23

## 2025-04-14 RX ADMIN — KETOROLAC TROMETHAMINE 30 MG: 30 INJECTION, SOLUTION INTRAMUSCULAR at 23:02

## 2025-04-14 RX ADMIN — Medication 3 MG: at 21:50

## 2025-04-14 RX ADMIN — Medication 10 ML: at 22:37

## 2025-04-14 RX ADMIN — HYDROMORPHONE HYDROCHLORIDE 0.5 MG: 1 INJECTION, SOLUTION INTRAMUSCULAR; INTRAVENOUS; SUBCUTANEOUS at 17:50

## 2025-04-14 RX ADMIN — ENOXAPARIN SODIUM 40 MG: 100 INJECTION SUBCUTANEOUS at 15:23

## 2025-04-14 ASSESSMENT — PAIN DESCRIPTION - PAIN TYPE
TYPE: ACUTE PAIN

## 2025-04-14 ASSESSMENT — PAIN DESCRIPTION - LOCATION
LOCATION: ABDOMEN
LOCATION: KNEE
LOCATION: KNEE
LOCATION: LEG
LOCATION: KNEE
LOCATION: KNEE;LEG
LOCATION: KNEE
LOCATION: LEG

## 2025-04-14 ASSESSMENT — PAIN DESCRIPTION - FREQUENCY
FREQUENCY: CONTINUOUS

## 2025-04-14 ASSESSMENT — PAIN SCALES - GENERAL
PAINLEVEL_OUTOF10: 7
PAINLEVEL_OUTOF10: 10
PAINLEVEL_OUTOF10: 10
PAINLEVEL_OUTOF10: 7
PAINLEVEL_OUTOF10: 0
PAINLEVEL_OUTOF10: 4
PAINLEVEL_OUTOF10: 10
PAINLEVEL_OUTOF10: 4
PAINLEVEL_OUTOF10: 10
PAINLEVEL_OUTOF10: 8
PAINLEVEL_OUTOF10: 6
PAINLEVEL_OUTOF10: 0
PAINLEVEL_OUTOF10: 5

## 2025-04-14 ASSESSMENT — PAIN DESCRIPTION - DESCRIPTORS
DESCRIPTORS: ACHING;DISCOMFORT
DESCRIPTORS: ACHING
DESCRIPTORS: ACHING
DESCRIPTORS: ACHING;DISCOMFORT
DESCRIPTORS: PATIENT UNABLE TO DESCRIBE

## 2025-04-14 ASSESSMENT — PAIN - FUNCTIONAL ASSESSMENT
PAIN_FUNCTIONAL_ASSESSMENT: ACTIVITIES ARE NOT PREVENTED
PAIN_FUNCTIONAL_ASSESSMENT: PREVENTS OR INTERFERES SOME ACTIVE ACTIVITIES AND ADLS
PAIN_FUNCTIONAL_ASSESSMENT: ACTIVITIES ARE NOT PREVENTED
PAIN_FUNCTIONAL_ASSESSMENT: ACTIVITIES ARE NOT PREVENTED
PAIN_FUNCTIONAL_ASSESSMENT: 0-10
PAIN_FUNCTIONAL_ASSESSMENT: ACTIVITIES ARE NOT PREVENTED

## 2025-04-14 ASSESSMENT — PAIN DESCRIPTION - ORIENTATION
ORIENTATION: RIGHT;LEFT
ORIENTATION: RIGHT;LEFT
ORIENTATION: LEFT

## 2025-04-14 ASSESSMENT — PAIN DESCRIPTION - ONSET
ONSET: ON-GOING

## 2025-04-14 ASSESSMENT — PAIN SCALES - WONG BAKER
WONGBAKER_NUMERICALRESPONSE: NO HURT

## 2025-04-14 NOTE — ED TRIAGE NOTES
Pt to ed via ems from home c/o patella tendon pain. Pt states surgeon told her to come here to get transferred to [place] for surgery

## 2025-04-14 NOTE — DISCHARGE INSTRUCTIONS
Please go immediately to the [facility] for evaluation by orthopedic surgery.  Do not eat or drink prior to your arrival.

## 2025-04-14 NOTE — TELEPHONE ENCOUNTER
Spoke with patient about her situations. We discussed all the possibilities and we have decided for her to go to [facility], ED attending is aware to transfer to [place] for a follow up work. She will be ADMITTED TO [facility] ED.     EDIT: going to [place] ED then Transfer to [facility] ED

## 2025-04-14 NOTE — ED NOTES
Report given to transport team at this time. All questions and concerns addressed. Pt moved from ED stretcher to EMS stretcher. Pt tolerated well.

## 2025-04-14 NOTE — PROGRESS NOTES
4 Eyes Skin Assessment     NAME:  Argentina Barrios  YOB: 1985  MEDICAL RECORD NUMBER:  [id number]    The patient is being assessed for  Admission    I agree that at least one RN has performed a thorough Head to Toe Skin Assessment on the patient. ALL assessment sites listed below have been assessed.      Areas assessed by both nurses:    Head, Face, Ears, Shoulders, Back, Chest, Arms, Elbows, Hands, Sacrum. Buttock, Coccyx, Ischium, and Legs. Feet and Heels        Does the Patient have a Wound? No noted wound(s)       Jordan Prevention initiated by RN: No  Wound Care Orders initiated by RN: No    Pressure Injury (Stage 3,4, Unstageable, DTI, NWPT, and Complex wounds) if present, place Wound referral order by RN under : No    New Ostomies, if present place, Ostomy referral order under : No     Nurse 1 eSignature: Electronically signed by Bernice Coreas RN on 4/14/25 at 5:17 PM EDT    **SHARE this note so that the co-signing nurse can place an eSignature**    Nurse 2 eSignature: Electronically signed by Cassandra Light RN on 4/14/25 at 7:42 PM EDT

## 2025-04-14 NOTE — H&P
Department of Orthopedic Surgery  Attending History and Physical        DIAGNOSIS:  Left patella tendon rupture    CHIEF COMPLAINT:  Left knee pain and unable to ambulate    History Obtained From:  patient, electronic medical record    HISTORY OF PRESENT ILLNESS:      The patient is a 39 y.o. female with fall while walking on stairs .  She missed a step and stumbled causing a popping sensation in the anterior aspect of her left knee with immediate pain.  Knee buckled and she was unable to extend it.  She presented to [facility] emergency department and x-rays showed no evidence for acute fracture but a high riding patella consistent with patella tendon tear.  She was placed in a knee immobilizer and discharged with phone numbers for orthopedic follow-up.  She called this morning and did not have any access to a ride in order to be seen as an outpatient.  After careful discussion she required emergency transfer to one of the local [facility] emergency departments for than transfer to [facility] for definitive treatment of her injury.  She denies any fevers or chills.  No chest pain or shortness of breath.  No numbness or tingling down the leg.  No history of prior DVT.  No history of prior knee surgery.  No history of anesthetic complications.  She has been admitted and MRI will be performed for thorough evaluation of her injury with plans for patella tendon repair April 15.      Past Medical History:        Diagnosis Date    ADHD (attention deficit hyperactivity disorder)     Allergic rhinitis     Depression     Vitamin D deficiency      Past Surgical History:        Procedure Laterality Date     SECTION  ,     HYSTERECTOMY (CERVIX STATUS UNKNOWN)      LAPAROTOMY  2009    \"mass\"    TONSILLECTOMY  1988    US BIOPSY OF SALIVARY GLAND  2024    US BIOPSY OF SALIVARY GLAND 2024 Vicki Solorio, APRN - CNP [facility] ULTRASOUND     Medications Prior to Admission:

## 2025-04-14 NOTE — FLOWSHEET NOTE
04/14/25 1200   Vital Signs   Temp 98.7 °F (37.1 °C)   Temp Source Oral   Pulse 74   Heart Rate Source Monitor   Respirations 18   BP (!) 144/67   MAP (Calculated) 93   BP Location Right upper arm   BP Method Automatic   Patient Position Semi fowlers   Pain Assessment   Pain Assessment 0-10   Pain Level 10   Pain Location Knee   Pain Orientation Left   Pain Descriptors Aching;Discomfort   Pain Type Acute pain   Pain Frequency Continuous   Pain Onset On-going   Oxygen Therapy   SpO2 98 %   O2 Device None (Room air)   O2 Flow Rate (L/min) 0 L/min   Height and Weight   Height 1.702 m (5' 7\")   Weight - Scale 88.5 kg (195 lb 3.2 oz)   Weight Method Actual;Bed scale   BSA (Calculated - sq m) 2.05 sq meters   BMI (Calculated) 30.6     Pt admitted to 3rd floor for L knee pain. Ortho consulted. No s/s of distress. Admission completed. Chg bath completed.    Bernice Coreas RN

## 2025-04-14 NOTE — PROGRESS NOTES
04/14/25 1548   Encounter Summary   Encounter Overview/Reason Attempted Encounter   Service Provided For Patient not available   Referral/Consult From Rounding   Last Encounter  04/14/25  (Patient was with staff/CK)

## 2025-04-14 NOTE — CARE COORDINATION
Discharge Planning Note:    Chart reviewed and it appears that patient has minimal needs for discharge at this time. Risk Score 7 %     Primary Care Physician is SANTIAGO GRAF   Primary insurance is [employer] [place] [employer]     Please notify case management if any discharge needs are identified.      Case management will continue to follow progress and update discharge plan as needed.     Pt from home. Await PT/OT evals for disposition planning.

## 2025-04-14 NOTE — ED PROVIDER NOTES
[facility] EMERGENCY DEPARTMENT      EMERGENCY MEDICINE     Pt Name: Argentina Barrios  MRN: [id number]  Birthdate 1985  Date of evaluation: 2025  Provider: Javier Heredia DO    CHIEF COMPLAINT       Chief Complaint   Patient presents with    Knee Pain     Pt to ed via ems from home c/o patella tendon pain. Pt states surgeon told her to come here to get transferred to [facility] for surgery.      HISTORY OF PRESENT ILLNESS   Argentina Barrios is a 39 y.o. female who presents to the emergency department for left knee pain.  Patient was seen on 2025 at an outside freestanding ED due to left knee pain after walking down the stairs and feeling a pop.  She was diagnosed with a patellar tendon rupture.  She was supposed to have surgery today [place], however the patient is unable to bear weight nor did she have any transportation.  She was near [facility] I received a call from Dr. Hsu who is the orthopedic surgeon at [place].  He was sending her to our facility by ambulance for transport to [place] for surgery.  She states that she is NPO.  States that she is got sensation and motor function, however has been in a knee stabilizer due to pain in her knee.  She does not have any calf pain or swelling.  No other complaints at this time.        PASTMEDICAL HISTORY     Past Medical History:   Diagnosis Date    ADHD (attention deficit hyperactivity disorder)     Allergic rhinitis     Depression     Vitamin D deficiency        Patient Active Problem List   Diagnosis Code    ADHD (attention deficit hyperactivity disorder) F90.9    Depression F32.A    Vitamin D deficiency E55.9     SURGICAL HISTORY       Past Surgical History:   Procedure Laterality Date     SECTION  ,     HYSTERECTOMY (CERVIX STATUS UNKNOWN)      LAPAROTOMY  2009    \"mass\"    TONSILLECTOMY  1988    US BIOPSY OF SALIVARY GLAND  2024    US BIOPSY OF SALIVARY GLAND 2024 Lyndsey

## 2025-04-14 NOTE — PROGRESS NOTES
Bedside report and transfer of care given to MAIRA Cummings. Pt currently resting in bed with the call light within reach. Pt denies any other care needs at this time. Pt stable at this time.      Bernice Coreas RN

## 2025-04-15 ENCOUNTER — ANESTHESIA (OUTPATIENT)
Age: 40
End: 2025-04-15
Payer: COMMERCIAL

## 2025-04-15 ENCOUNTER — APPOINTMENT (OUTPATIENT)
Age: 40
End: 2025-04-15
Attending: ORTHOPAEDIC SURGERY
Payer: COMMERCIAL

## 2025-04-15 PROCEDURE — 1200000000 HC SEMI PRIVATE

## 2025-04-15 PROCEDURE — 73560 X-RAY EXAM OF KNEE 1 OR 2: CPT

## 2025-04-15 PROCEDURE — C1769 GUIDE WIRE: HCPCS | Performed by: ORTHOPAEDIC SURGERY

## 2025-04-15 PROCEDURE — 2709999900 HC NON-CHARGEABLE SUPPLY: Performed by: ORTHOPAEDIC SURGERY

## 2025-04-15 PROCEDURE — 2500000003 HC RX 250 WO HCPCS: Performed by: ORTHOPAEDIC SURGERY

## 2025-04-15 PROCEDURE — 2580000003 HC RX 258: Performed by: ANESTHESIOLOGY

## 2025-04-15 PROCEDURE — 2580000003 HC RX 258: Performed by: ORTHOPAEDIC SURGERY

## 2025-04-15 PROCEDURE — 7100000000 HC PACU RECOVERY - FIRST 15 MIN: Performed by: ORTHOPAEDIC SURGERY

## 2025-04-15 PROCEDURE — 6360000002 HC RX W HCPCS: Performed by: ANESTHESIOLOGY

## 2025-04-15 PROCEDURE — 2720000010 HC SURG SUPPLY STERILE: Performed by: ORTHOPAEDIC SURGERY

## 2025-04-15 PROCEDURE — 3700000001 HC ADD 15 MINUTES (ANESTHESIA): Performed by: ORTHOPAEDIC SURGERY

## 2025-04-15 PROCEDURE — 6360000002 HC RX W HCPCS: Performed by: ORTHOPAEDIC SURGERY

## 2025-04-15 PROCEDURE — 64447 NJX AA&/STRD FEMORAL NRV IMG: CPT | Performed by: ANESTHESIOLOGY

## 2025-04-15 PROCEDURE — 94760 N-INVAS EAR/PLS OXIMETRY 1: CPT

## 2025-04-15 PROCEDURE — 7100000001 HC PACU RECOVERY - ADDTL 15 MIN: Performed by: ORTHOPAEDIC SURGERY

## 2025-04-15 PROCEDURE — 6370000000 HC RX 637 (ALT 250 FOR IP): Performed by: ORTHOPAEDIC SURGERY

## 2025-04-15 PROCEDURE — 94640 AIRWAY INHALATION TREATMENT: CPT

## 2025-04-15 PROCEDURE — 6360000002 HC RX W HCPCS: Performed by: NURSE ANESTHETIST, CERTIFIED REGISTERED

## 2025-04-15 PROCEDURE — 3700000000 HC ANESTHESIA ATTENDED CARE: Performed by: ORTHOPAEDIC SURGERY

## 2025-04-15 PROCEDURE — 3600000014 HC SURGERY LEVEL 4 ADDTL 15MIN: Performed by: ORTHOPAEDIC SURGERY

## 2025-04-15 PROCEDURE — 2500000003 HC RX 250 WO HCPCS: Performed by: NURSE ANESTHETIST, CERTIFIED REGISTERED

## 2025-04-15 PROCEDURE — C1763 CONN TISS, NON-HUMAN: HCPCS | Performed by: ORTHOPAEDIC SURGERY

## 2025-04-15 PROCEDURE — 94150 VITAL CAPACITY TEST: CPT

## 2025-04-15 PROCEDURE — 3600000004 HC SURGERY LEVEL 4 BASE: Performed by: ORTHOPAEDIC SURGERY

## 2025-04-15 PROCEDURE — C1713 ANCHOR/SCREW BN/BN,TIS/BN: HCPCS | Performed by: ORTHOPAEDIC SURGERY

## 2025-04-15 PROCEDURE — 2500000003 HC RX 250 WO HCPCS: Performed by: ANESTHESIOLOGY

## 2025-04-15 PROCEDURE — 0LQR0ZZ REPAIR LEFT KNEE TENDON, OPEN APPROACH: ICD-10-PCS | Performed by: ORTHOPAEDIC SURGERY

## 2025-04-15 PROCEDURE — L1810 KO ELASTIC WITH JOINTS: HCPCS | Performed by: ORTHOPAEDIC SURGERY

## 2025-04-15 DEVICE — DEVICE GRFT FIX 4.75X19.1 MM BIOCOMPOSITE SWIVELOCK: Type: IMPLANTABLE DEVICE | Site: KNEE | Status: FUNCTIONAL

## 2025-04-15 DEVICE — IMPLANTABLE DEVICE
Type: IMPLANTABLE DEVICE | Site: KNEE | Status: FUNCTIONAL
Brand: BIOINDUCTIVE IMPLANT WITH ARTHROSCOPIC DELIVERY SYSTEM - MEDIUM

## 2025-04-15 DEVICE — ANCHOR SUTURE DIA2.6 MM SELF PUNCHING INTERNALBRACE TECH: Type: IMPLANTABLE DEVICE | Site: KNEE | Status: FUNCTIONAL

## 2025-04-15 RX ORDER — ROPIVACAINE HYDROCHLORIDE 5 MG/ML
INJECTION, SOLUTION EPIDURAL; INFILTRATION; PERINEURAL
Status: COMPLETED | OUTPATIENT
Start: 2025-04-15 | End: 2025-04-15

## 2025-04-15 RX ORDER — LORAZEPAM 2 MG/ML
0.5 INJECTION INTRAMUSCULAR
Status: DISCONTINUED | OUTPATIENT
Start: 2025-04-15 | End: 2025-04-15 | Stop reason: RX

## 2025-04-15 RX ORDER — MEPERIDINE HYDROCHLORIDE 25 MG/ML
12.5 INJECTION INTRAMUSCULAR; INTRAVENOUS; SUBCUTANEOUS
Status: DISCONTINUED | OUTPATIENT
Start: 2025-04-15 | End: 2025-04-15 | Stop reason: HOSPADM

## 2025-04-15 RX ORDER — SODIUM CHLORIDE 9 MG/ML
INJECTION, SOLUTION INTRAVENOUS PRN
Status: DISCONTINUED | OUTPATIENT
Start: 2025-04-15 | End: 2025-04-15 | Stop reason: HOSPADM

## 2025-04-15 RX ORDER — GLYCOPYRROLATE 0.2 MG/ML
INJECTION INTRAMUSCULAR; INTRAVENOUS
Status: DISCONTINUED | OUTPATIENT
Start: 2025-04-15 | End: 2025-04-15 | Stop reason: SDUPTHER

## 2025-04-15 RX ORDER — MAGNESIUM HYDROXIDE 1200 MG/15ML
LIQUID ORAL CONTINUOUS PRN
Status: COMPLETED | OUTPATIENT
Start: 2025-04-15 | End: 2025-04-15

## 2025-04-15 RX ORDER — LIDOCAINE HYDROCHLORIDE 20 MG/ML
INJECTION, SOLUTION EPIDURAL; INFILTRATION; INTRACAUDAL; PERINEURAL
Status: DISCONTINUED | OUTPATIENT
Start: 2025-04-15 | End: 2025-04-15 | Stop reason: SDUPTHER

## 2025-04-15 RX ORDER — FENTANYL CITRATE 50 UG/ML
50 INJECTION, SOLUTION INTRAMUSCULAR; INTRAVENOUS EVERY 5 MIN PRN
Status: DISCONTINUED | OUTPATIENT
Start: 2025-04-15 | End: 2025-04-15 | Stop reason: HOSPADM

## 2025-04-15 RX ORDER — SODIUM CHLORIDE 9 MG/ML
INJECTION, SOLUTION INTRAVENOUS PRN
Status: DISCONTINUED | OUTPATIENT
Start: 2025-04-15 | End: 2025-04-25 | Stop reason: HOSPADM

## 2025-04-15 RX ORDER — SODIUM CHLORIDE 0.9 % (FLUSH) 0.9 %
5-40 SYRINGE (ML) INJECTION EVERY 12 HOURS SCHEDULED
Status: DISCONTINUED | OUTPATIENT
Start: 2025-04-15 | End: 2025-04-15 | Stop reason: HOSPADM

## 2025-04-15 RX ORDER — FENTANYL CITRATE 50 UG/ML
INJECTION, SOLUTION INTRAMUSCULAR; INTRAVENOUS
Status: DISCONTINUED | OUTPATIENT
Start: 2025-04-15 | End: 2025-04-15 | Stop reason: SDUPTHER

## 2025-04-15 RX ORDER — NALOXONE HYDROCHLORIDE 0.4 MG/ML
INJECTION, SOLUTION INTRAMUSCULAR; INTRAVENOUS; SUBCUTANEOUS PRN
Status: DISCONTINUED | OUTPATIENT
Start: 2025-04-15 | End: 2025-04-15 | Stop reason: HOSPADM

## 2025-04-15 RX ORDER — ONDANSETRON 2 MG/ML
4 INJECTION INTRAMUSCULAR; INTRAVENOUS
Status: DISCONTINUED | OUTPATIENT
Start: 2025-04-15 | End: 2025-04-15 | Stop reason: HOSPADM

## 2025-04-15 RX ORDER — ENOXAPARIN SODIUM 100 MG/ML
40 INJECTION SUBCUTANEOUS DAILY
Status: DISCONTINUED | OUTPATIENT
Start: 2025-04-16 | End: 2025-04-25 | Stop reason: HOSPADM

## 2025-04-15 RX ORDER — DIPHENHYDRAMINE HYDROCHLORIDE 50 MG/ML
12.5 INJECTION, SOLUTION INTRAMUSCULAR; INTRAVENOUS
Status: DISCONTINUED | OUTPATIENT
Start: 2025-04-15 | End: 2025-04-15 | Stop reason: HOSPADM

## 2025-04-15 RX ORDER — FENTANYL CITRATE 50 UG/ML
50 INJECTION, SOLUTION INTRAMUSCULAR; INTRAVENOUS ONCE
Refills: 0 | Status: COMPLETED | OUTPATIENT
Start: 2025-04-15 | End: 2025-04-15

## 2025-04-15 RX ORDER — MELOXICAM 7.5 MG/1
7.5 TABLET ORAL DAILY
Status: COMPLETED | OUTPATIENT
Start: 2025-04-15 | End: 2025-04-17

## 2025-04-15 RX ORDER — ACETAMINOPHEN 325 MG/1
650 TABLET ORAL EVERY 6 HOURS
Status: DISCONTINUED | OUTPATIENT
Start: 2025-04-15 | End: 2025-04-24

## 2025-04-15 RX ORDER — HALOPERIDOL 5 MG/ML
1 INJECTION INTRAMUSCULAR
Status: DISCONTINUED | OUTPATIENT
Start: 2025-04-15 | End: 2025-04-15 | Stop reason: HOSPADM

## 2025-04-15 RX ORDER — LORAZEPAM 1 MG/1
0.5 TABLET ORAL
Status: DISCONTINUED | OUTPATIENT
Start: 2025-04-15 | End: 2025-04-25 | Stop reason: HOSPADM

## 2025-04-15 RX ORDER — SODIUM CHLORIDE 0.9 % (FLUSH) 0.9 %
5-40 SYRINGE (ML) INJECTION PRN
Status: DISCONTINUED | OUTPATIENT
Start: 2025-04-15 | End: 2025-04-15 | Stop reason: HOSPADM

## 2025-04-15 RX ORDER — DEXAMETHASONE SODIUM PHOSPHATE 10 MG/ML
8 INJECTION, SOLUTION INTRAMUSCULAR; INTRAVENOUS ONCE
Status: COMPLETED | OUTPATIENT
Start: 2025-04-15 | End: 2025-04-15

## 2025-04-15 RX ORDER — SODIUM CHLORIDE 0.9 % (FLUSH) 0.9 %
5-40 SYRINGE (ML) INJECTION PRN
Status: DISCONTINUED | OUTPATIENT
Start: 2025-04-15 | End: 2025-04-25 | Stop reason: HOSPADM

## 2025-04-15 RX ORDER — ONDANSETRON 2 MG/ML
INJECTION INTRAMUSCULAR; INTRAVENOUS
Status: DISCONTINUED | OUTPATIENT
Start: 2025-04-15 | End: 2025-04-15 | Stop reason: SDUPTHER

## 2025-04-15 RX ORDER — PROPOFOL 10 MG/ML
INJECTION, EMULSION INTRAVENOUS
Status: DISCONTINUED | OUTPATIENT
Start: 2025-04-15 | End: 2025-04-15 | Stop reason: SDUPTHER

## 2025-04-15 RX ORDER — ROCURONIUM BROMIDE 10 MG/ML
INJECTION, SOLUTION INTRAVENOUS
Status: DISCONTINUED | OUTPATIENT
Start: 2025-04-15 | End: 2025-04-15 | Stop reason: SDUPTHER

## 2025-04-15 RX ORDER — SODIUM CHLORIDE 9 MG/ML
INJECTION, SOLUTION INTRAVENOUS CONTINUOUS
Status: DISCONTINUED | OUTPATIENT
Start: 2025-04-15 | End: 2025-04-17

## 2025-04-15 RX ORDER — MIDAZOLAM HYDROCHLORIDE 1 MG/ML
INJECTION, SOLUTION INTRAMUSCULAR; INTRAVENOUS
Status: DISCONTINUED | OUTPATIENT
Start: 2025-04-15 | End: 2025-04-15 | Stop reason: SDUPTHER

## 2025-04-15 RX ORDER — CEFAZOLIN SODIUM 2 G/50ML
2000 SOLUTION INTRAVENOUS
Status: COMPLETED | OUTPATIENT
Start: 2025-04-15 | End: 2025-04-15

## 2025-04-15 RX ORDER — MIDAZOLAM HYDROCHLORIDE 1 MG/ML
2 INJECTION, SOLUTION INTRAMUSCULAR; INTRAVENOUS ONCE
Status: COMPLETED | OUTPATIENT
Start: 2025-04-15 | End: 2025-04-15

## 2025-04-15 RX ORDER — SODIUM CHLORIDE 0.9 % (FLUSH) 0.9 %
5-40 SYRINGE (ML) INJECTION EVERY 12 HOURS SCHEDULED
Status: DISCONTINUED | OUTPATIENT
Start: 2025-04-15 | End: 2025-04-25 | Stop reason: HOSPADM

## 2025-04-15 RX ADMIN — PROPOFOL 150 MG: 10 INJECTION, EMULSION INTRAVENOUS at 12:15

## 2025-04-15 RX ADMIN — ACETAMINOPHEN 650 MG: 325 TABLET ORAL at 21:33

## 2025-04-15 RX ADMIN — SUGAMMADEX 100 MG: 100 INJECTION, SOLUTION INTRAVENOUS at 13:24

## 2025-04-15 RX ADMIN — ROCURONIUM BROMIDE 50 MG: 10 INJECTION INTRAVENOUS at 12:16

## 2025-04-15 RX ADMIN — HYDROMORPHONE HYDROCHLORIDE 0.25 MG: 1 INJECTION, SOLUTION INTRAMUSCULAR; INTRAVENOUS; SUBCUTANEOUS at 13:35

## 2025-04-15 RX ADMIN — KETOROLAC TROMETHAMINE 30 MG: 30 INJECTION, SOLUTION INTRAMUSCULAR at 15:48

## 2025-04-15 RX ADMIN — MIDAZOLAM HYDROCHLORIDE 2 MG: 1 INJECTION, SOLUTION INTRAMUSCULAR; INTRAVENOUS at 11:22

## 2025-04-15 RX ADMIN — CEFAZOLIN SODIUM 2000 MG: 2 SOLUTION INTRAVENOUS at 12:18

## 2025-04-15 RX ADMIN — SODIUM CHLORIDE: 0.9 INJECTION, SOLUTION INTRAVENOUS at 08:43

## 2025-04-15 RX ADMIN — FENTANYL CITRATE 50 MCG: 50 INJECTION INTRAMUSCULAR; INTRAVENOUS at 14:19

## 2025-04-15 RX ADMIN — HYDROMORPHONE HYDROCHLORIDE 0.5 MG: 1 INJECTION, SOLUTION INTRAMUSCULAR; INTRAVENOUS; SUBCUTANEOUS at 23:15

## 2025-04-15 RX ADMIN — MELOXICAM 7.5 MG: 7.5 TABLET ORAL at 17:57

## 2025-04-15 RX ADMIN — SODIUM CHLORIDE: 0.9 INJECTION, SOLUTION INTRAVENOUS at 15:53

## 2025-04-15 RX ADMIN — HYDROMORPHONE HYDROCHLORIDE 0.5 MG: 1 INJECTION, SOLUTION INTRAMUSCULAR; INTRAVENOUS; SUBCUTANEOUS at 08:56

## 2025-04-15 RX ADMIN — FENTANYL CITRATE 50 MCG: 50 INJECTION INTRAMUSCULAR; INTRAVENOUS at 11:22

## 2025-04-15 RX ADMIN — GLYCOPYRROLATE 0.2 MG: 0.2 INJECTION INTRAMUSCULAR; INTRAVENOUS at 12:59

## 2025-04-15 RX ADMIN — HYDROMORPHONE HYDROCHLORIDE 0.25 MG: 1 INJECTION, SOLUTION INTRAMUSCULAR; INTRAVENOUS; SUBCUTANEOUS at 13:32

## 2025-04-15 RX ADMIN — LIDOCAINE HYDROCHLORIDE 80 MG: 20 INJECTION, SOLUTION EPIDURAL; INFILTRATION; INTRACAUDAL; PERINEURAL at 12:15

## 2025-04-15 RX ADMIN — HYDROMORPHONE HYDROCHLORIDE 0.5 MG: 1 INJECTION, SOLUTION INTRAMUSCULAR; INTRAVENOUS; SUBCUTANEOUS at 19:28

## 2025-04-15 RX ADMIN — SODIUM CHLORIDE: 9 INJECTION, SOLUTION INTRAVENOUS at 11:20

## 2025-04-15 RX ADMIN — WATER 2000 MG: 1 INJECTION INTRAMUSCULAR; INTRAVENOUS; SUBCUTANEOUS at 20:48

## 2025-04-15 RX ADMIN — HYDROMORPHONE HYDROCHLORIDE 0.5 MG: 1 INJECTION, SOLUTION INTRAMUSCULAR; INTRAVENOUS; SUBCUTANEOUS at 00:31

## 2025-04-15 RX ADMIN — ROPIVACAINE HYDROCHLORIDE 15 ML: 5 INJECTION EPIDURAL; INFILTRATION; PERINEURAL at 11:22

## 2025-04-15 RX ADMIN — FENTANYL CITRATE 50 MCG: 50 INJECTION INTRAMUSCULAR; INTRAVENOUS at 14:31

## 2025-04-15 RX ADMIN — HYDROMORPHONE HYDROCHLORIDE 0.5 MG: 1 INJECTION, SOLUTION INTRAMUSCULAR; INTRAVENOUS; SUBCUTANEOUS at 13:24

## 2025-04-15 RX ADMIN — Medication 3 MG: at 21:33

## 2025-04-15 RX ADMIN — GLYCOPYRROLATE 0.1 MG: 0.2 INJECTION INTRAMUSCULAR; INTRAVENOUS at 12:15

## 2025-04-15 RX ADMIN — SUGAMMADEX 100 MG: 100 INJECTION, SOLUTION INTRAVENOUS at 13:21

## 2025-04-15 RX ADMIN — HYDROMORPHONE HYDROCHLORIDE 0.25 MG: 1 INJECTION, SOLUTION INTRAMUSCULAR; INTRAVENOUS; SUBCUTANEOUS at 14:56

## 2025-04-15 RX ADMIN — DEXAMETHASONE SODIUM PHOSPHATE 8 MG: 10 INJECTION, SOLUTION INTRAMUSCULAR; INTRAVENOUS at 10:56

## 2025-04-15 RX ADMIN — FENTANYL CITRATE 100 MCG: 50 INJECTION INTRAMUSCULAR; INTRAVENOUS at 12:15

## 2025-04-15 RX ADMIN — ACETAMINOPHEN 650 MG: 325 TABLET ORAL at 17:57

## 2025-04-15 RX ADMIN — SODIUM CHLORIDE, PRESERVATIVE FREE 10 ML: 5 INJECTION INTRAVENOUS at 20:49

## 2025-04-15 RX ADMIN — PROPOFOL 50 MG: 10 INJECTION, EMULSION INTRAVENOUS at 13:25

## 2025-04-15 RX ADMIN — ONDANSETRON 4 MG: 2 INJECTION, SOLUTION INTRAMUSCULAR; INTRAVENOUS at 20:48

## 2025-04-15 RX ADMIN — ONDANSETRON 4 MG: 2 INJECTION, SOLUTION INTRAMUSCULAR; INTRAVENOUS at 10:56

## 2025-04-15 RX ADMIN — MIDAZOLAM 2 MG: 1 INJECTION INTRAMUSCULAR; INTRAVENOUS at 12:15

## 2025-04-15 RX ADMIN — SODIUM CHLORIDE: 0.9 INJECTION, SOLUTION INTRAVENOUS at 00:39

## 2025-04-15 RX ADMIN — ONDANSETRON 4 MG: 2 INJECTION, SOLUTION INTRAMUSCULAR; INTRAVENOUS at 13:44

## 2025-04-15 ASSESSMENT — PAIN - FUNCTIONAL ASSESSMENT
PAIN_FUNCTIONAL_ASSESSMENT: ACTIVITIES ARE NOT PREVENTED
PAIN_FUNCTIONAL_ASSESSMENT: 0-10
PAIN_FUNCTIONAL_ASSESSMENT: ACTIVITIES ARE NOT PREVENTED
PAIN_FUNCTIONAL_ASSESSMENT: PREVENTS OR INTERFERES SOME ACTIVE ACTIVITIES AND ADLS
PAIN_FUNCTIONAL_ASSESSMENT: 0-10
PAIN_FUNCTIONAL_ASSESSMENT: ACTIVITIES ARE NOT PREVENTED
PAIN_FUNCTIONAL_ASSESSMENT: ACTIVITIES ARE NOT PREVENTED
PAIN_FUNCTIONAL_ASSESSMENT: 0-10
PAIN_FUNCTIONAL_ASSESSMENT: 0-10
PAIN_FUNCTIONAL_ASSESSMENT: ACTIVITIES ARE NOT PREVENTED
PAIN_FUNCTIONAL_ASSESSMENT: ACTIVITIES ARE NOT PREVENTED
PAIN_FUNCTIONAL_ASSESSMENT: 0-10
PAIN_FUNCTIONAL_ASSESSMENT: 0-10

## 2025-04-15 ASSESSMENT — PAIN DESCRIPTION - PAIN TYPE
TYPE: ACUTE PAIN;SURGICAL PAIN
TYPE: ACUTE PAIN;SURGICAL PAIN
TYPE: ACUTE PAIN
TYPE: ACUTE PAIN;SURGICAL PAIN

## 2025-04-15 ASSESSMENT — PAIN DESCRIPTION - DIRECTION: RADIATING_TOWARDS: N

## 2025-04-15 ASSESSMENT — PAIN DESCRIPTION - ONSET
ONSET: ON-GOING

## 2025-04-15 ASSESSMENT — PAIN DESCRIPTION - LOCATION
LOCATION: KNEE;LEG
LOCATION: LEG
LOCATION: KNEE
LOCATION: KNEE;LEG
LOCATION: LEG
LOCATION: LEG;KNEE
LOCATION: KNEE;LEG
LOCATION: KNEE;LEG

## 2025-04-15 ASSESSMENT — PAIN DESCRIPTION - ORIENTATION
ORIENTATION: LEFT

## 2025-04-15 ASSESSMENT — PAIN DESCRIPTION - DESCRIPTORS
DESCRIPTORS: ACHING;DISCOMFORT
DESCRIPTORS: ACHING;DISCOMFORT
DESCRIPTORS: ACHING
DESCRIPTORS: DISCOMFORT;ACHING
DESCRIPTORS: ACHING;DISCOMFORT
DESCRIPTORS: ACHING
DESCRIPTORS: ACHING;DISCOMFORT
DESCRIPTORS: ACHING
DESCRIPTORS: ACHING;DISCOMFORT

## 2025-04-15 ASSESSMENT — PAIN SCALES - GENERAL
PAINLEVEL_OUTOF10: 10
PAINLEVEL_OUTOF10: 0
PAINLEVEL_OUTOF10: 4
PAINLEVEL_OUTOF10: 0
PAINLEVEL_OUTOF10: 7
PAINLEVEL_OUTOF10: 9
PAINLEVEL_OUTOF10: 3
PAINLEVEL_OUTOF10: 0
PAINLEVEL_OUTOF10: 7
PAINLEVEL_OUTOF10: 10
PAINLEVEL_OUTOF10: 6
PAINLEVEL_OUTOF10: 0
PAINLEVEL_OUTOF10: 0
PAINLEVEL_OUTOF10: 7

## 2025-04-15 ASSESSMENT — PAIN DESCRIPTION - FREQUENCY
FREQUENCY: CONTINUOUS

## 2025-04-15 ASSESSMENT — LIFESTYLE VARIABLES: SMOKING_STATUS: 1

## 2025-04-15 ASSESSMENT — PAIN SCALES - WONG BAKER
WONGBAKER_NUMERICALRESPONSE: NO HURT

## 2025-04-15 ASSESSMENT — ENCOUNTER SYMPTOMS: SHORTNESS OF BREATH: 0

## 2025-04-15 NOTE — PROGRESS NOTES
Occupational & Physical Therapy Evaluation Attempt     Discussed with Nursing Bernice Coreas, Rn, noted patient not appropriate for mobility evaluation at present as patient lethargic and crying. Pt stating that she is in 10/10 pain.\" Will re-attempt 4/16/25 as patient medically appropriate and able to participate in therapy evaluation.   An Houser, PT  Jojo Smith OTR

## 2025-04-15 NOTE — PROGRESS NOTES
Pt transferred to room 3221 at this time. A&O with no signs of distress. Report given to Bernice RAO. V/u and denies questions or further needs at this time. Family at bedside.

## 2025-04-15 NOTE — PROGRESS NOTES
Patient arrived to pre op bay 7 for procedure, patient alert and oriented x 4, admission completed, IV infusing, request to call spouse spouse Syd KELVIN at [phone] when procedure complete.

## 2025-04-15 NOTE — PROGRESS NOTES
Pt stable and able to be transferred from PACU to room 3221. A&O , VSS, pain improved. Bernice RAO called and notified that pt is being transferred out of PACU and to room.

## 2025-04-15 NOTE — BRIEF OP NOTE
Brief Postoperative Note      Patient: Argentina Barrios  YOB: 1985  MRN: [id number]    Date of Procedure: 4/15/2025    Pre-Op Diagnosis Codes:      * Patellar tendon rupture, left, initial encounter [S86.812A]    Post-Op Diagnosis: Same       Procedure(s):  LEFT PATELLA TENDON REPAIR    Surgeon(s):  Marcus Hsu MD    Assistant:  Surgical Assistant: Talon Desai    Anesthesia: General    Estimated Blood Loss (mL): less than 50     Complications: None    Specimens:   * No specimens in log *    Implants:  Implant Name Type Inv. Item Serial No.  Lot No. LRB No. Used Action   ANCHOR SUTURE DIA2.6 MM SELF PUNCHING INTERNALBRACE TECH - [id number]  ANCHOR SUTURE DIA2.6 MM SELF PUNCHING INTERNALBRACE TECH  [employer] [id number] Left 1 Implanted   ANCHOR SUTURE DIA2.6 MM SELF PUNCHING INTERNALBRACE TECH - SNA  ANCHOR SUTURE DIA2.6 MM SELF PUNCHING INTERNALBRACE TECH NA [employer] [id number] Left 1 Implanted   DEVICE GRFT FIX 4.75X19.1 MM BIOCOMPOSITE SWIVELOCK - SNA  DEVICE GRFT FIX 4.75X19.1 MM BIOCOMPOSITE SWIVELOCK NA [employer] [id number] Left 1 Implanted   IMPLANT BIOINDUCTIVE M BOV ACHILLES TEND W/ ARTHSCP DEL SYS - SNA  IMPLANT BIOINDUCTIVE M BOV ACHILLES TEND W/ ARTHSCP DEL SYS NA [employer] [id number] Left 1 Implanted   DEVICE GRFT FIX 4.75X19.1 MM BIOCOMPOSITE SWIVELOCK - SNA  DEVICE GRFT FIX 4.75X19.1 MM BIOCOMPOSITE SWIVELOCK NA [employer] [id number] Left 1 Implanted         Drains:   [REMOVED] External Urinary Catheter (Removed)   Site Assessment Clean,dry & intact 04/14/25 2000   Placement Repositioned 04/14/25 2000   Securement Method Securing device (Describe) 04/14/25 2000   Catheter Care Catheter/Wick replaced;Suction Canister/Tubing changed 04/14/25 2000   Perineal Care Yes 04/14/25 2000   Suction 40 mmgHg continuous 04/14/25 2000       Findings:  Infection Present At Time Of Surgery (PATOS) (choose all levels that have infection present):  No  infection present  Other Findings: Midsubstance patella tendon rupture.    Electronically signed by BETHANIE LUKE MD on 4/15/2025 at 1:28 PM

## 2025-04-15 NOTE — PROGRESS NOTES
[facility] - Rehabilitation Department      Physical Therapy  3221/3221-01  [facility] 3 PROGRESSIVE CARE    [x] Initial Evaluation            [x] Daily Treatment Note         [] Discharge Summary      Patient: Argentina Barrios   : 1985   MRN: [id number]   Date of Service:  2025   Discharge Recommendations: Home with 24  hr Supv/assist vs SNF if unable to progress to [facility]. Pt requesting SNF   [facility] Raw Score:   AM-PAC Inpatient Mobility Raw Score : 15            DME Required For Discharge:   rolling walker  Therapy discharge recommendations take into account each patient's current medical complexities and are subject to input/oversight from the patient's healthcare team.   Barriers to Home Discharge:   [x] Steps to access home entry or bed/bath:   [x] Unable to transfer, ambulate, or propel wheelchair household distances without assist   [] Limited available assist at home upon discharge    [] Patient or family requests d/c to post-acute facility    [] Poor cognition/safety awareness for d/c to home alone    []Unable to maintain ordered weight bearing status    [] Patient with salient signs of long-standing immobility   [x] Patient is at risk for falls due to: unsteady gait with increased assist to Max with walking around bed with patient with increasing unsteady gait and increased pain and weakness per patient.    [] Other:  If pt is unable to be seen after this session, please let this note serve as discharge summary.  Please see case management note for discharge disposition.  Thank you.  Admitting Diagnosis: Patellar tendon rupture, left, initial encounter  Ordering Physician: Marcus Hsu MD  Current Admission Summary:   25 H&P Marcus Hsu MD,  \"DIAGNOSIS:  Left patella tendon rupture     CHIEF COMPLAINT:  Left knee pain and unable to ambulateHISTORY OF PRESENT ILLNESS:       The patient is a 39 y.o. female with fall while walking on stairs .  She missed a step

## 2025-04-15 NOTE — ANESTHESIA PROCEDURE NOTES
Peripheral Block    Patient location during procedure: pre-op  Reason for block: post-op pain management and at surgeon's request  Start time: 4/15/2025 11:20 AM  End time: 4/15/2025 11:22 AM  Staffing  Performed: anesthesiologist   Anesthesiologist: Marcus Ko MD  Performed by: Marcus Ko MD  Authorized by: Marcus Ko MD    Preanesthetic Checklist  Completed: patient identified, IV checked, site marked, risks and benefits discussed, surgical/procedural consents, equipment checked, pre-op evaluation, timeout performed, anesthesia consent given, oxygen available and monitors applied/VS acknowledged  Peripheral Block   Patient position: supine  Prep: ChloraPrep  Provider prep: mask and sterile gloves  Patient monitoring: cardiac monitor, continuous pulse ox, frequent blood pressure checks and IV access  Block type: Femoral  Adductor canal  Laterality: left  Injection technique: single-shot  Guidance: ultrasound guided    Needle   Needle type: short-bevel   Needle gauge: 22 G  Needle localization: ultrasound guidance  Needle length: 5 cm  Assessment   Injection assessment: negative aspiration for heme, no paresthesia on injection, local visualized surrounding nerve on ultrasound and no intravascular symptoms  Paresthesia pain: none  Slow fractionated injection: yes  Hemodynamics: stable  Outcomes: uncomplicated and patient tolerated procedure well    Additional Notes  Sartorius and Vastus Medialis Muscle, Femoral artery and Saphenous nerve are identified; the tip of the needle and the spread of the local anesthetic around the Saphenous nerve are visualized. The Saphenous nerve appeared to be anatomically normal and there were no abnormal pathologically findings seen.   Medications Administered  ropivacaine (NAROPIN) injection 0.5% - Perineural   15 mL - 4/15/2025 11:22:00 AM

## 2025-04-15 NOTE — FLOWSHEET NOTE
04/15/25 1548   Pain Assessment   Pain Assessment 0-10   Pain Level 10   Buitrago-Baker Pain Rating 0   Patient's Stated Pain Goal 0 - No pain   Pain Location Leg   Pain Orientation Left   Pain Descriptors Discomfort;Aching   Functional Pain Assessment Activities are not prevented   Pain Type Acute pain;Surgical pain   Pain Radiating Towards n/a   Pain Frequency Continuous   Pain Onset On-going   Non-Pharmaceutical Pain Intervention(s) Declines     Pt returned from surgery. Pt alert and oriented; however lethargic and crying. Pt stating that she is in 10/10 pain. Toradol given for pain as described above. Family at bedside; updated on care plan.    Bernice Coreas RN

## 2025-04-15 NOTE — FLOWSHEET NOTE
04/15/25 0856   Vital Signs   Pulse 84   Heart Rate Source Monitor   Respirations 17   /64   MAP (Calculated) 82   BP Location Right upper arm   Pain Assessment   Pain Assessment 0-10   Pain Level 7   Patient's Stated Pain Goal 0 - No pain   Pain Location Knee;Leg   Pain Orientation Left   Pain Descriptors Aching;Discomfort   Functional Pain Assessment Prevents or interferes some active activities and ADLs   Non-Pharmaceutical Pain Intervention(s) Declines   Opioid-Induced Sedation   POSS Score 1   Oxygen Therapy   SpO2 96 %   O2 Device None (Room air)   O2 Flow Rate (L/min) 0 L/min     Pt assessment complete; see flow sheets. Vitals completed. No s/s of distress. NPO for surgery; goal for surgery at 11am. Pt stable.    Bernice Coreas RN

## 2025-04-15 NOTE — PROGRESS NOTES
Pt arrived from OR to PACU bay 2. Reported received from OR staff. Pt arousable to voice. Surgical incisions dressings in place to LLE. Pt on RA, NSR, and VSS. Will continue to monitor.

## 2025-04-15 NOTE — ANESTHESIA POSTPROCEDURE EVALUATION
Department of Anesthesiology  Postprocedure Note    Patient: Argentina Barrios  MRN: [id number]  YOB: 1985  Date of evaluation: 4/15/2025    Procedure Summary       Date: 04/15/25 Room / Location: [address]    Anesthesia Start: 1206 Anesthesia Stop: 1355    Procedure: LEFT PATELLA TENDON REPAIR (Left: Knee) Diagnosis:       Patellar tendon rupture, left, initial encounter      (Patellar tendon rupture, left, initial encounter [S86.812A])    Surgeons: Marcus Hsu MD Responsible Provider: Marcus Ko MD    Anesthesia Type: general, regional ASA Status: 2            Anesthesia Type: No value filed.    Kristin Phase I: Kristin Score: 9    Kristin Phase II:      Anesthesia Post Evaluation    Patient location during evaluation: PACU  Patient participation: complete - patient participated  Level of consciousness: awake and alert  Pain score: 6  Nausea & Vomiting: no nausea  Cardiovascular status: hemodynamically stable  Respiratory status: acceptable  Hydration status: stable  Multimodal analgesia pain management approach  Pain management: adequate    No notable events documented.

## 2025-04-15 NOTE — ANESTHESIA PRE PROCEDURE
Department of Anesthesiology  Preprocedure Note       Name:  Argentina Barrios   Age:  39 y.o.  :  1985                                          MRN:  [id number]         Date:  4/15/2025      Surgeon: Surgeon(s):  Marcus Hsu MD    Procedure: Procedure(s):  LEFT PATELLA TENDON REPAIR    Medications prior to admission:   Prior to Admission medications    Medication Sig Start Date End Date Taking? Authorizing Provider   meloxicam (MOBIC) 15 MG tablet Take 1 tablet by mouth daily 25  Yes Larry Chisholm MD   ibuprofen (ADVIL;MOTRIN) 800 MG tablet Take 1 tablet by mouth every 8 hours as needed for Pain 1/3/25  Yes Lindsey Ware PA-C   ondansetron (ZOFRAN-ODT) 4 MG disintegrating tablet Take 1 tablet by mouth every 8 hours as needed for Nausea or Vomiting 1/3/25  Yes Lindsey Ware PA-C       Current medications:    Current Facility-Administered Medications   Medication Dose Route Frequency Provider Last Rate Last Admin   • sodium chloride flush 0.9 % injection 5-40 mL  5-40 mL IntraVENous 2 times per day Jonathan Teresa MD       • sodium chloride flush 0.9 % injection 5-40 mL  5-40 mL IntraVENous PRN Jonathan Treesa MD       • 0.9 % sodium chloride infusion   IntraVENous PRN Jonathan Teresa MD       • ceFAZolin (ANCEF) 2000 mg in dextrose 3 % 50 mL IVPB (duplex)  2,000 mg IntraVENous On Call to OR Marcus Hsu MD       • sodium chloride flush 0.9 % injection 5-40 mL  5-40 mL IntraVENous 2 times per day Marcus Hsu MD   10 mL at 257   • sodium chloride flush 0.9 % injection 5-40 mL  5-40 mL IntraVENous PRN Marcus Hsu MD       • 0.9 % sodium chloride infusion   IntraVENous PRN Marcus Hsu MD       • potassium chloride (KLOR-CON M) extended release tablet 40 mEq  40 mEq Oral PRN Marcus Hsu MD        Or   • potassium bicarb-citric acid (EFFER-K) effervescent tablet 40 mEq  40 mEq Oral PRN Marcus Hsu MD        Or   •

## 2025-04-15 NOTE — PROGRESS NOTES
Incentive Spirometry education and demonstration completed by Respiratory Therapy.  Turning over to Nursing for routine follow-up.  Minimum Predicted Vital Capacity - 918 mL.  Patient's Actual Vital Capacity - 2000  mL.

## 2025-04-15 NOTE — PROGRESS NOTES
Department of Orthopedic Surgery  Attending Progress Note        Subjective: No new events and pain well-controlled ready for surgery.    Vitals  VITALS:  /82   Pulse (!) 42   Temp 98.7 °F (37.1 °C) (Infrared)   Resp 11   Ht 1.702 m (5' 7\")   Wt 88.5 kg (195 lb 3.2 oz)   LMP 12/25/2012   SpO2 100%   BMI 30.57 kg/m²   24HR INTAKE/OUTPUT:    Intake/Output Summary (Last 24 hours) at 4/15/2025 1126  Last data filed at 4/15/2025 0648  Gross per 24 hour   Intake --   Output 300 ml   Net -300 ml       PHYSICAL EXAM:    Orientation:  alert and oriented to person, place and time    Left Lower Extremity    Mild swelling at the knee with trace ecchymosis.  Palpable defect at the patella tendon with high riding patella.  Calf soft with negative Homans' sign.  Gross motor function to her left ankle intact.  Sensation to light touch grossly present throughout the left lower extremity and capillary refill less than 2 seconds.    LABS:    HgB:    Lab Results   Component Value Date/Time    HGB 12.5 04/14/2025 12:46 PM       ASSESSMENT AND PLAN:    Hospital day 2 left knee with patella tendon rupture    Reviewed again the nature of the surgery compared to nonoperative treatment as well as the risks, benefits and potential complications.  She understands the risks of surgery include but are not limited to: Infection, risk to neurovascular structures, failure of repair, possible need for further surgery, anesthetic misadventure and other medical surgical complications that could threaten her life or limb.  She understands that the recovery will take 3 to 6 months and can be plagued with stiffness.  She understands she will need to wear a brace and be compliant with her physical therapy protocol.  She is prepared to proceed and her consent was obtained.  All of her questions were answered to her satisfaction.

## 2025-04-16 LAB — GLUCOSE BLD-MCNC: 108 MG/DL (ref 70–99)

## 2025-04-16 PROCEDURE — 6360000002 HC RX W HCPCS: Performed by: ORTHOPAEDIC SURGERY

## 2025-04-16 PROCEDURE — 82962 GLUCOSE BLOOD TEST: CPT

## 2025-04-16 PROCEDURE — 97116 GAIT TRAINING THERAPY: CPT

## 2025-04-16 PROCEDURE — 97110 THERAPEUTIC EXERCISES: CPT

## 2025-04-16 PROCEDURE — 6370000000 HC RX 637 (ALT 250 FOR IP): Performed by: ORTHOPAEDIC SURGERY

## 2025-04-16 PROCEDURE — 2500000003 HC RX 250 WO HCPCS: Performed by: ORTHOPAEDIC SURGERY

## 2025-04-16 PROCEDURE — 97161 PT EVAL LOW COMPLEX 20 MIN: CPT

## 2025-04-16 PROCEDURE — 97530 THERAPEUTIC ACTIVITIES: CPT

## 2025-04-16 PROCEDURE — 97165 OT EVAL LOW COMPLEX 30 MIN: CPT

## 2025-04-16 PROCEDURE — 97535 SELF CARE MNGMENT TRAINING: CPT

## 2025-04-16 PROCEDURE — 1200000000 HC SEMI PRIVATE

## 2025-04-16 RX ADMIN — ACETAMINOPHEN 650 MG: 325 TABLET ORAL at 21:18

## 2025-04-16 RX ADMIN — HYDROMORPHONE HYDROCHLORIDE 0.5 MG: 1 INJECTION, SOLUTION INTRAMUSCULAR; INTRAVENOUS; SUBCUTANEOUS at 17:27

## 2025-04-16 RX ADMIN — WATER 2000 MG: 1 INJECTION INTRAMUSCULAR; INTRAVENOUS; SUBCUTANEOUS at 04:36

## 2025-04-16 RX ADMIN — SODIUM CHLORIDE, PRESERVATIVE FREE 10 ML: 5 INJECTION INTRAVENOUS at 08:09

## 2025-04-16 RX ADMIN — HYDROMORPHONE HYDROCHLORIDE 0.5 MG: 1 INJECTION, SOLUTION INTRAMUSCULAR; INTRAVENOUS; SUBCUTANEOUS at 21:19

## 2025-04-16 RX ADMIN — HYDROMORPHONE HYDROCHLORIDE 0.5 MG: 1 INJECTION, SOLUTION INTRAMUSCULAR; INTRAVENOUS; SUBCUTANEOUS at 10:04

## 2025-04-16 RX ADMIN — ACETAMINOPHEN 650 MG: 325 TABLET ORAL at 04:36

## 2025-04-16 RX ADMIN — KETOROLAC TROMETHAMINE 30 MG: 30 INJECTION, SOLUTION INTRAMUSCULAR at 08:07

## 2025-04-16 RX ADMIN — ONDANSETRON 4 MG: 2 INJECTION, SOLUTION INTRAMUSCULAR; INTRAVENOUS at 08:07

## 2025-04-16 RX ADMIN — SODIUM CHLORIDE, PRESERVATIVE FREE 10 ML: 5 INJECTION INTRAVENOUS at 21:19

## 2025-04-16 RX ADMIN — ENOXAPARIN SODIUM 40 MG: 100 INJECTION SUBCUTANEOUS at 08:09

## 2025-04-16 RX ADMIN — MELOXICAM 7.5 MG: 7.5 TABLET ORAL at 08:08

## 2025-04-16 RX ADMIN — ACETAMINOPHEN 650 MG: 325 TABLET ORAL at 08:08

## 2025-04-16 RX ADMIN — Medication 3 MG: at 21:18

## 2025-04-16 RX ADMIN — HYDROMORPHONE HYDROCHLORIDE 0.5 MG: 1 INJECTION, SOLUTION INTRAMUSCULAR; INTRAVENOUS; SUBCUTANEOUS at 04:49

## 2025-04-16 RX ADMIN — ACETAMINOPHEN 650 MG: 325 TABLET ORAL at 15:46

## 2025-04-16 ASSESSMENT — PAIN DESCRIPTION - DESCRIPTORS
DESCRIPTORS: THROBBING
DESCRIPTORS: ACHING;DISCOMFORT
DESCRIPTORS: THROBBING
DESCRIPTORS: THROBBING
DESCRIPTORS: SQUEEZING;THROBBING

## 2025-04-16 ASSESSMENT — PAIN SCALES - WONG BAKER
WONGBAKER_NUMERICALRESPONSE: HURTS LITTLE MORE

## 2025-04-16 ASSESSMENT — PAIN DESCRIPTION - ORIENTATION
ORIENTATION: LEFT

## 2025-04-16 ASSESSMENT — PAIN SCALES - GENERAL
PAINLEVEL_OUTOF10: 4
PAINLEVEL_OUTOF10: 5
PAINLEVEL_OUTOF10: 5
PAINLEVEL_OUTOF10: 10
PAINLEVEL_OUTOF10: 0
PAINLEVEL_OUTOF10: 9
PAINLEVEL_OUTOF10: 9
PAINLEVEL_OUTOF10: 0
PAINLEVEL_OUTOF10: 0
PAINLEVEL_OUTOF10: 9
PAINLEVEL_OUTOF10: 7

## 2025-04-16 ASSESSMENT — PAIN DESCRIPTION - LOCATION
LOCATION: LEG
LOCATION: KNEE
LOCATION: LEG

## 2025-04-16 ASSESSMENT — PAIN - FUNCTIONAL ASSESSMENT
PAIN_FUNCTIONAL_ASSESSMENT: ACTIVITIES ARE NOT PREVENTED
PAIN_FUNCTIONAL_ASSESSMENT: PREVENTS OR INTERFERES SOME ACTIVE ACTIVITIES AND ADLS
PAIN_FUNCTIONAL_ASSESSMENT: ACTIVITIES ARE NOT PREVENTED

## 2025-04-16 ASSESSMENT — PAIN DESCRIPTION - ONSET
ONSET: GRADUAL
ONSET: ON-GOING
ONSET: GRADUAL
ONSET: ON-GOING
ONSET: ON-GOING

## 2025-04-16 ASSESSMENT — PAIN DESCRIPTION - FREQUENCY
FREQUENCY: INTERMITTENT
FREQUENCY: CONTINUOUS
FREQUENCY: CONTINUOUS

## 2025-04-16 ASSESSMENT — PAIN DESCRIPTION - PAIN TYPE
TYPE: SURGICAL PAIN
TYPE: ACUTE PAIN;SURGICAL PAIN
TYPE: SURGICAL PAIN

## 2025-04-16 NOTE — PLAN OF CARE
Problem: Discharge Planning  Goal: Discharge to home or other facility with appropriate resources  4/15/2025 2144 by Zoila Dumont RN  Outcome: Progressing  Flowsheets (Taken 4/15/2025 2048)  Discharge to home or other facility with appropriate resources:   Identify barriers to discharge with patient and caregiver   Arrange for needed discharge resources and transportation as appropriate   Identify discharge learning needs (meds, wound care, etc)   Arrange for interpreters to assist at discharge as needed   Refer to discharge planning if patient needs post-hospital services based on physician order or complex needs related to functional status, cognitive ability or social support system  4/15/2025 1004 by Bernice Coreas RN  Outcome: Progressing     Problem: Pain  Goal: Verbalizes/displays adequate comfort level or baseline comfort level  4/15/2025 2144 by Zoila Dumont RN  Outcome: Progressing  4/15/2025 1004 by Bernice Coreas RN  Outcome: Progressing     Problem: Safety - Adult  Goal: Free from fall injury  4/15/2025 2144 by Zoila Dumont RN  Outcome: Progressing  Flowsheets (Taken 4/15/2025 2048)  Free From Fall Injury:   Instruct family/caregiver on patient safety   Based on caregiver fall risk screen, instruct family/caregiver to ask for assistance with transferring infant if caregiver noted to have fall risk factors  4/15/2025 1004 by Bernice Coreas RN  Outcome: Progressing     Problem: ABCDS Injury Assessment  Goal: Absence of physical injury  Outcome: Progressing

## 2025-04-16 NOTE — PLAN OF CARE
Problem: Physical Therapy - Adult  Goal: By Discharge: Performs mobility at highest level of function for planned discharge setting.  See evaluation for individualized goals.  Outcome: Progressing   An Houser, PT

## 2025-04-16 NOTE — PLAN OF CARE
Problem: Occupational Therapy - Adult  Goal: By Discharge: Performs self-care activities at highest level of function for planned discharge setting.  See evaluation for individualized goals.  Outcome: Progressing   Jojo Smith OTR

## 2025-04-16 NOTE — PROGRESS NOTES
[facility] - Rehabilitation Department       Occupational Therapy  3221/3221-01  [facility] 3 PROGRESSIVE CARE    [x] Initial Evaluation            [x] Daily Treatment Note         [] Discharge Summary      Patient: Argentina Barrios   : 1985   MRN: [id number]   Date of Service:  2025  Discharge Recommendations: Home w/ 24hr assist & HHOT vs SNF if unable to progress to Mod I     AM-PAC Inpatient Daily Activity Raw Score: 18    DME Required For Discharge: crutches, tub transfer bench, grab bars - toilet, grab bars - shower, reacher, sock-aid, long-handle sponge    Therapy discharge recommendations take into account each patient's current medical complexities and are subject to input/oversight from the patient's healthcare team.     Barriers to Home Discharge:   [x] Steps to access home entry or bed/bath:   [x] Unable to transfer, ambulate, or propel wheelchair household distances without assist   [] Limited available assist at home upon discharge    [] Patient or family requests d/c to post-acute facility    [] Poor cognition/safety awareness for d/c to home alone    []Unable to maintain ordered weight bearing status    [] Patient with salient signs of long-standing immobility   [x] Patient is at risk for falls due to: decreased activity tolerance   [x] Other: decrease ADL & fx mobility    If pt is unable to be seen after this session, please let this note serve as discharge summary.  Please see case management note for discharge disposition.  Thank you.    Admitting Diagnosis:  Patellar tendon rupture, left, initial encounter  Referring Physician: Marcus Hsu MD   Current Admission Summary: Marcus Hsu MD H&P :  \"DIAGNOSIS:  Left patella tendon rupture     CHIEF COMPLAINT:  Left knee pain and unable to ambulate     History Obtained From:  patient, electronic medical record     HISTORY OF PRESENT ILLNESS:       The patient is a 39 y.o. female with fall while walking on  (0) independent

## 2025-04-17 ENCOUNTER — TELEPHONE (OUTPATIENT)
Dept: ORTHOPEDIC SURGERY | Age: 40
End: 2025-04-17

## 2025-04-17 ENCOUNTER — TELEPHONE (OUTPATIENT)
Age: 40
End: 2025-04-17

## 2025-04-17 PROCEDURE — 97110 THERAPEUTIC EXERCISES: CPT

## 2025-04-17 PROCEDURE — 2580000003 HC RX 258: Performed by: ORTHOPAEDIC SURGERY

## 2025-04-17 PROCEDURE — 94640 AIRWAY INHALATION TREATMENT: CPT

## 2025-04-17 PROCEDURE — 94150 VITAL CAPACITY TEST: CPT

## 2025-04-17 PROCEDURE — 97530 THERAPEUTIC ACTIVITIES: CPT

## 2025-04-17 PROCEDURE — 99024 POSTOP FOLLOW-UP VISIT: CPT | Performed by: PHYSICIAN ASSISTANT

## 2025-04-17 PROCEDURE — 6360000002 HC RX W HCPCS: Performed by: ORTHOPAEDIC SURGERY

## 2025-04-17 PROCEDURE — 97116 GAIT TRAINING THERAPY: CPT

## 2025-04-17 PROCEDURE — 6370000000 HC RX 637 (ALT 250 FOR IP): Performed by: ORTHOPAEDIC SURGERY

## 2025-04-17 PROCEDURE — 97535 SELF CARE MNGMENT TRAINING: CPT

## 2025-04-17 PROCEDURE — 1200000000 HC SEMI PRIVATE

## 2025-04-17 PROCEDURE — 2500000003 HC RX 250 WO HCPCS: Performed by: ORTHOPAEDIC SURGERY

## 2025-04-17 PROCEDURE — 94760 N-INVAS EAR/PLS OXIMETRY 1: CPT

## 2025-04-17 RX ORDER — ASPIRIN 81 MG/1
81 TABLET ORAL 2 TIMES DAILY
Qty: 60 TABLET | Refills: 0
Start: 2025-04-17 | End: 2025-05-17

## 2025-04-17 RX ORDER — POLYETHYLENE GLYCOL 3350 17 G/17G
17 POWDER, FOR SOLUTION ORAL DAILY PRN
Qty: 30 PACKET | Refills: 0
Start: 2025-04-17 | End: 2025-04-28 | Stop reason: SDUPTHER

## 2025-04-17 RX ORDER — ENOXAPARIN SODIUM 100 MG/ML
40 INJECTION SUBCUTANEOUS DAILY
Qty: 12 ML | Refills: 0
Start: 2025-04-18 | End: 2025-04-17 | Stop reason: HOSPADM

## 2025-04-17 RX ORDER — OXYCODONE HYDROCHLORIDE 5 MG/1
5-10 TABLET ORAL
Qty: 40 TABLET | Refills: 0 | Status: SHIPPED | OUTPATIENT
Start: 2025-04-17 | End: 2025-04-24

## 2025-04-17 RX ADMIN — Medication 3 MG: at 20:31

## 2025-04-17 RX ADMIN — SODIUM CHLORIDE: 0.9 INJECTION, SOLUTION INTRAVENOUS at 00:00

## 2025-04-17 RX ADMIN — OXYCODONE HYDROCHLORIDE 10 MG: 5 TABLET ORAL at 11:21

## 2025-04-17 RX ADMIN — ENOXAPARIN SODIUM 40 MG: 100 INJECTION SUBCUTANEOUS at 08:36

## 2025-04-17 RX ADMIN — ACETAMINOPHEN 650 MG: 325 TABLET ORAL at 03:37

## 2025-04-17 RX ADMIN — ACETAMINOPHEN 650 MG: 325 TABLET ORAL at 20:31

## 2025-04-17 RX ADMIN — POLYETHYLENE GLYCOL 3350 17 G: 17 POWDER, FOR SOLUTION ORAL at 08:52

## 2025-04-17 RX ADMIN — HYDROMORPHONE HYDROCHLORIDE 0.5 MG: 1 INJECTION, SOLUTION INTRAMUSCULAR; INTRAVENOUS; SUBCUTANEOUS at 23:46

## 2025-04-17 RX ADMIN — SODIUM CHLORIDE, PRESERVATIVE FREE 10 ML: 5 INJECTION INTRAVENOUS at 08:37

## 2025-04-17 RX ADMIN — HYDROMORPHONE HYDROCHLORIDE 0.5 MG: 1 INJECTION, SOLUTION INTRAMUSCULAR; INTRAVENOUS; SUBCUTANEOUS at 03:38

## 2025-04-17 RX ADMIN — HYDROMORPHONE HYDROCHLORIDE 0.5 MG: 1 INJECTION, SOLUTION INTRAMUSCULAR; INTRAVENOUS; SUBCUTANEOUS at 16:38

## 2025-04-17 RX ADMIN — MELOXICAM 7.5 MG: 7.5 TABLET ORAL at 08:35

## 2025-04-17 RX ADMIN — KETOROLAC TROMETHAMINE 30 MG: 30 INJECTION, SOLUTION INTRAMUSCULAR at 16:37

## 2025-04-17 RX ADMIN — HYDROMORPHONE HYDROCHLORIDE 0.5 MG: 1 INJECTION, SOLUTION INTRAMUSCULAR; INTRAVENOUS; SUBCUTANEOUS at 19:41

## 2025-04-17 RX ADMIN — OXYCODONE HYDROCHLORIDE 10 MG: 5 TABLET ORAL at 15:36

## 2025-04-17 RX ADMIN — SODIUM CHLORIDE: 0.9 INJECTION, SOLUTION INTRAVENOUS at 08:42

## 2025-04-17 RX ADMIN — ACETAMINOPHEN 650 MG: 325 TABLET ORAL at 10:13

## 2025-04-17 RX ADMIN — HYDROMORPHONE HYDROCHLORIDE 0.5 MG: 1 INJECTION, SOLUTION INTRAMUSCULAR; INTRAVENOUS; SUBCUTANEOUS at 08:36

## 2025-04-17 RX ADMIN — ACETAMINOPHEN 650 MG: 325 TABLET ORAL at 15:37

## 2025-04-17 RX ADMIN — HYDROMORPHONE HYDROCHLORIDE 0.5 MG: 1 INJECTION, SOLUTION INTRAMUSCULAR; INTRAVENOUS; SUBCUTANEOUS at 13:15

## 2025-04-17 ASSESSMENT — PAIN - FUNCTIONAL ASSESSMENT
PAIN_FUNCTIONAL_ASSESSMENT: ACTIVITIES ARE NOT PREVENTED
PAIN_FUNCTIONAL_ASSESSMENT: PREVENTS OR INTERFERES WITH MANY ACTIVE NOT PASSIVE ACTIVITIES
PAIN_FUNCTIONAL_ASSESSMENT: PREVENTS OR INTERFERES SOME ACTIVE ACTIVITIES AND ADLS
PAIN_FUNCTIONAL_ASSESSMENT: PREVENTS OR INTERFERES SOME ACTIVE ACTIVITIES AND ADLS
PAIN_FUNCTIONAL_ASSESSMENT: ACTIVITIES ARE NOT PREVENTED
PAIN_FUNCTIONAL_ASSESSMENT: ACTIVITIES ARE NOT PREVENTED

## 2025-04-17 ASSESSMENT — PAIN SCALES - GENERAL
PAINLEVEL_OUTOF10: 9
PAINLEVEL_OUTOF10: 7
PAINLEVEL_OUTOF10: 9
PAINLEVEL_OUTOF10: 5
PAINLEVEL_OUTOF10: 8
PAINLEVEL_OUTOF10: 9
PAINLEVEL_OUTOF10: 9
PAINLEVEL_OUTOF10: 7
PAINLEVEL_OUTOF10: 4
PAINLEVEL_OUTOF10: 9
PAINLEVEL_OUTOF10: 10
PAINLEVEL_OUTOF10: 4
PAINLEVEL_OUTOF10: 9
PAINLEVEL_OUTOF10: 9
PAINLEVEL_OUTOF10: 4
PAINLEVEL_OUTOF10: 9
PAINLEVEL_OUTOF10: 7
PAINLEVEL_OUTOF10: 10

## 2025-04-17 ASSESSMENT — PAIN DESCRIPTION - LOCATION
LOCATION: LEG
LOCATION: LEG
LOCATION: KNEE
LOCATION: LEG
LOCATION: KNEE
LOCATION: LEG

## 2025-04-17 ASSESSMENT — PAIN DESCRIPTION - DESCRIPTORS
DESCRIPTORS: ACHING;DISCOMFORT;SHARP;THROBBING
DESCRIPTORS: ACHING;DISCOMFORT;SHARP
DESCRIPTORS: ACHING;THROBBING
DESCRIPTORS: ACHING
DESCRIPTORS: ACHING;DISCOMFORT;THROBBING;SHARP
DESCRIPTORS: THROBBING
DESCRIPTORS: THROBBING;STABBING;DISCOMFORT
DESCRIPTORS: ACHING;DISCOMFORT;THROBBING;SHARP
DESCRIPTORS: ACHING;DISCOMFORT;THROBBING;SHARP

## 2025-04-17 ASSESSMENT — PAIN DESCRIPTION - ORIENTATION
ORIENTATION: LEFT

## 2025-04-17 ASSESSMENT — PAIN DESCRIPTION - ONSET
ONSET: ON-GOING
ONSET: AWAKENED FROM SLEEP
ONSET: ON-GOING

## 2025-04-17 ASSESSMENT — PAIN DESCRIPTION - FREQUENCY
FREQUENCY: CONTINUOUS

## 2025-04-17 ASSESSMENT — PAIN DESCRIPTION - PAIN TYPE
TYPE: ACUTE PAIN;SURGICAL PAIN

## 2025-04-17 NOTE — TELEPHONE ENCOUNTER
----- Message from Maricarmen S sent at 4/17/2025  9:09 AM EDT -----  Regarding: Specialty Message to Provider  Specialty Message to Provider    Relationship to Patient: Spouse/Partner JOHNY     Patient Message: CHECKING TO SEE IF YOU RECEIVED THE FMLA FOR HIM TO TAKE CARE OF HIS WIFE.  --------------------------------------------------------------------------------------------------------------------------    Call Back Information: OK to leave message on voicemail  Preferred Call Back Number: [phone]

## 2025-04-17 NOTE — PROGRESS NOTES
Department of Orthopedic Surgery  Progress Note        Subjective:  No complaints, just concerned that she cannot lift her leg yet but I reinforced with her that she is not allowed to do active knee extensions or straight leg raises yet.    Vitals  vss    PHYSICAL EXAM:    Orientation:  alert and oriented to person, place and time    Left Lower Extremity    Incision:  dressing in place, clean, dry, and intact    Lower Extremity Motor :   Quadriceps:  2/5  Extensor hallucis:  5/5  Dorsiflexion:  5/5  Plantarflexion:  5/5    Lower Extremity Sensory:  Tibial Nerve:  intact  Superficial Peroneal Nerve:  intact  Deep Peroneal Nerve:  intact    Pulses:  Dorsalis Pedis:  2    Abnormal Exam findings:  none    Brace: T scope knee brace    LABS:    HgB:    Lab Results   Component Value Date/Time    HGB 12.5 04/14/2025 12:46 PM       ASSESSMENT AND PLAN:    Post operative day 2 status post repair of right knee intrasubstance patella tendon rupture.    1:  Weight bearing as tolerated with her knee brace locked in extension.  She can unlock the knee brace and passively bend the knee up to 30 degrees.  No active knee extension exercises and no straight leg raises x 6 weeks.  2:  Deep venous thrombosis prophylaxis -Lovenox while in the hospital.  She will transition to aspirin 81 mg twice daily at discharge  3:  Continue physical therapy and Occupational Therapy  4:  D/C Plan:  Skilled Nursing Facility  5:  Pain Control: Continue current pain management.  6: Follow-up with Dr. Hsu in 2 weeks and we will increase her flexion to 60 degrees passively at that point with the goal of achieving 90 degrees of passive flexion by 6 weeks and she can then begin active knee extension and active quad exercises at that point.

## 2025-04-17 NOTE — DISCHARGE INSTR - COC
Continuity of Care Form    Patient Name: Argentina Barrios   :  1985  MRN:  [id number]    Admit date:  2025  Discharge date:      Code Status Order: Full Code   Advance Directives:     Admitting Physician:  Marcus Hsu MD  PCP: Alberta Oh APRN - NP    Discharging Nurse:   Discharging Hospital Unit/Room#: 3221/3221-01  Discharging Unit Phone Number: [phone]    Emergency Contact:   Extended Emergency Contact Information  Primary Emergency Contact: Chao Barrios  Mobile Phone: [phone]  Relation: Parent  Secondary Emergency Contact: Salo Diaz  Mobile Phone: [phone]  Relation: None   needed? No    Past Surgical History:  Past Surgical History:   Procedure Laterality Date     SECTION  ,     HYSTERECTOMY (CERVIX STATUS UNKNOWN)      LAPAROTOMY  2009    \"mass\"    PATELLAR TENDON REPAIR Left 4/15/2025    LEFT PATELLA TENDON REPAIR performed by Marcus [facility]su MD at [facility] OR    TONSILLECTOMY  1988    US BIOPSY OF SALIVARY GLAND  2024    US BIOPSY OF SALIVARY GLAND 2024 Vicki Solorio, APRN - CNP [facility] ULTRASOUND       Immunization History:   Immunization History   Administered Date(s) Administered    COVID-19, MODERNA BLUE border, Primary or Immunocompromised, (age 12y+), IM, 100 mcg/0.5mL 2021, 2022    Hepatitis B 2011, 2012    TDaP, ADACEL (age 10y-64y), BOOSTRIX (age 10y+), IM, 0.5mL 2011       Active Problems:  Patient Active Problem List   Diagnosis Code    ADHD (attention deficit hyperactivity disorder) F90.9    Depression F32.A    Vitamin D deficiency E55.9    Patellar tendon rupture, left, initial encounter S86.763W       Isolation/Infection:   Isolation            No Isolation          Patient Infection Status    None to display         Nurse Assessment:  Last Vital Signs: BP (!) 119/99   Pulse (!) 48   Temp 98.2 °F (36.8 °C) (Oral)   Resp 16   Ht 1.702 m (5' 7\")   Wt 88.5 kg

## 2025-04-17 NOTE — DISCHARGE INSTRUCTIONS
Keep dressing clean and dry.  Change Mepilex every 3-5 days or as needed for excess drainage.  Please call physician if increased redness around incision, increased drainage, fevers, or pain becomes significantly worse.  Do not immerse in water such as baths but okay to shower with dressing on to protect wound.    F/U with Dr Hsu in 10-14 days.  Call [facility] for appointment time and date for follow up at [phone].        Weight Bearing on Surgical Side: Weight bearing as tolerated with her knee brace locked in extension. She can unlock the knee brace and passively bend the knee up to 30 degrees. No active knee extension exercises and no straight leg raises x 6 weeks.     Continue DVT Prophylaxis:  ASA 81mg BID for 30 days    Pain Medications  You were given oxycodone (Oxycontin, Oxyir)  Wean off pain medications as you deem appropriate as long as pain is under control  Be sure to drink plenty of fluids (recommend water) while taking narcotic pain medications to prevent constipation   You may take an over the counter laxative or stool softener as needed to prevent/treat constipation as well, we recommend Senokot S OTC.  We recommend that you consider taking these medications the entire time you are taking pain medication.  Cold packs/Ice packs/Machine  May be used as much as necessary to control swelling/inflammation/soreness  Be sure to have a barrier (cloth, clothing, towel) between the site and the ice pack to prevent frostbite  Contact [facility] office [phone] if  Increased redness, swelling, drainage of any kind, and/or pain to surgery site.  As well as new onset fevers and or chills.  These could signify an infection.  Calf or thigh tenderness to touch as well as increased swelling or redness.  This could signify a clot formation.  Numbness or tingling to an area around the incision site or below the incision site (toes).  Any rash appears, increased  or new onset

## 2025-04-17 NOTE — PROGRESS NOTES
Department of Orthopedic Surgery  Attending Progress Note        Subjective:  No complaints, just concerned that she cannot lift her leg yet but I reinforced with her that she is not allowed to do active knee extensions or straight leg raises yet.    Vitals  vss    PHYSICAL EXAM:    Orientation:  alert and oriented to person, place and time    Left Lower Extremity    Incision:  dressing in place, clean, dry, and intact    Lower Extremity Motor :   Quadriceps:  2/5  Extensor hallucis:  5/5  Dorsiflexion:  5/5  Plantarflexion:  5/5    Lower Extremity Sensory:  Tibial Nerve:  intact  Superficial Peroneal Nerve:  intact  Deep Peroneal Nerve:  intact    Pulses:  Dorsalis Pedis:  2    Abnormal Exam findings:  none    Brace: T scope knee brace    LABS:    HgB:    Lab Results   Component Value Date/Time    HGB 12.5 04/14/2025 12:46 PM       ASSESSMENT AND PLAN:    Post operative day 1 status post repair of right knee intrasubstance patella tendon rupture.    1:  Weight bearing as tolerated with her knee brace locked in extension.  She can unlock the knee brace and passively bend the knee up to 30 degrees.  No active knee extension exercises and no straight leg raises x 6 weeks.  2:  Deep venous thrombosis prophylaxis -Lovenox while in the hospital.  She will transition to aspirin 81 mg twice daily at discharge  3:  Continue physical therapy and Occupational Therapy  4:  D/C Plan:  Skilled Nursing Facility  5:  Pain Control: Continue current pain management.  6: Follow-up with Dr. Hsu in 2 weeks and we will increase her flexion to 60 degrees passively at that point with the goal of achieving 90 degrees of passive flexion by 6 weeks and she can then begin active knee extension and active quad exercises at that point.

## 2025-04-17 NOTE — PLAN OF CARE
Problem: Discharge Planning  Goal: Discharge to home or other facility with appropriate resources  Outcome: Progressing  Flowsheets (Taken 4/16/2025 2118)  Discharge to home or other facility with appropriate resources:   Identify barriers to discharge with patient and caregiver   Arrange for needed discharge resources and transportation as appropriate   Identify discharge learning needs (meds, wound care, etc)   Arrange for interpreters to assist at discharge as needed   Refer to discharge planning if patient needs post-hospital services based on physician order or complex needs related to functional status, cognitive ability or social support system     Problem: Pain  Goal: Verbalizes/displays adequate comfort level or baseline comfort level  Outcome: Progressing     Problem: Safety - Adult  Goal: Free from fall injury  Outcome: Progressing  Flowsheets (Taken 4/16/2025 2118)  Free From Fall Injury:   Instruct family/caregiver on patient safety   Based on caregiver fall risk screen, instruct family/caregiver to ask for assistance with transferring infant if caregiver noted to have fall risk factors     Problem: ABCDS Injury Assessment  Goal: Absence of physical injury  Outcome: Progressing  Flowsheets (Taken 4/16/2025 2118)  Absence of Physical Injury: Implement safety measures based on patient assessment     Problem: Occupational Therapy - Adult  Goal: By Discharge: Performs self-care activities at highest level of function for planned discharge setting.  See evaluation for individualized goals.  4/16/2025 1007 by Jojo Smith OT  Outcome: Progressing     Problem: Physical Therapy - Adult  Goal: By Discharge: Performs mobility at highest level of function for planned discharge setting.  See evaluation for individualized goals.  4/16/2025 1554 by An Houser, PT  Outcome: Progressing

## 2025-04-17 NOTE — PROGRESS NOTES
[facility] - Rehabilitation Department       Occupational Therapy  3221/3221-01  [facility] 3 PROGRESSIVE CARE    [] Initial Evaluation            [x] Daily Treatment Note         [] Discharge Summary      Patient: Argentina Barrios   : 1985   MRN: [id number]   Date of Service:  2025  Discharge Recommendations: Home w/ 24hr assist & HHOT vs SNF if unable to progress to Mod I     AM-PAC Inpatient Daily Activity Raw Score: 17    DME Required For Discharge: rolling walker, tub transfer bench, grab bars - toilet, grab bars - shower, reacher, sock-aid, long-handle sponge    Therapy discharge recommendations take into account each patient's current medical complexities and are subject to input/oversight from the patient's healthcare team.     Barriers to Home Discharge:   [x] Steps to access home entry or bed/bath:   [x] Unable to transfer, ambulate, or propel wheelchair household distances without assist   [x] Limited available assist at home upon discharge    [x] Patient or family requests d/c to post-acute facility    [] Poor cognition/safety awareness for d/c to home alone    []Unable to maintain ordered weight bearing status    [] Patient with salient signs of long-standing immobility   [x] Patient is at risk for falls due to: decreased activity tolerance   [x] Other: decrease ADL & fx mobility    If pt is unable to be seen after this session, please let this note serve as discharge summary.  Please see case management note for discharge disposition.  Thank you.    Admitting Diagnosis:  Patellar tendon rupture, left, initial encounter  Referring Physician: Marcus Hsu MD   Current Admission Summary: Marcus Hsu MD H&P :  \"DIAGNOSIS:  Left patella tendon rupture     CHIEF COMPLAINT:  Left knee pain and unable to ambulate     History Obtained From:  patient, electronic medical record     HISTORY OF PRESENT ILLNESS:       The patient is a 39 y.o. female with fall while walking  have a physical therapy assessment.  Preoperative routine labs have been ordered.  Should be n.p.o. after midnight.  Will begin DVT prophylaxis on postoperative day #1.  For now we will utilize sequential compression devices for DVT prophylaxis.\"    Marcus Hsu MD Brief Op Note 4/15:  \"Date of Procedure: 4/15/2025     Pre-Op Diagnosis Codes:      * Patellar tendon rupture, left, initial encounter [S86.812A]     Post-Op Diagnosis: Same       Procedure(s):  LEFT PATELLA TENDON REPAIR     Surgeon(s):  Marcus Hsu MD\"    Past Medical History:  has a past medical history of ADHD (attention deficit hyperactivity disorder), Allergic rhinitis, Depression, and Vitamin D deficiency.  Past Surgical History:  has a past surgical history that includes  section (, ); laparotomy (2009); Tonsillectomy (1988); Hysterectomy; US BIOPSY OF SALIVARY GLAND (2024); and Patellar tendon repair (Left, 4/15/2025).  __________________________________________________________________________________________________________________________________________________________  Assessment  Impairments Requiring Therapeutic Intervention: decreased functional mobility, decreased ADL status, decreased endurance, decreased balance, decreased IADL, increased pain  Prognosis: good    Assessment/Clinical Assessment   Pt seen today for occupational therapy Treatment.   Pt demonstrated decreased Activity tolerance, Functional Mobility , and Balance as well as decreased independence with Activities of Daily Living, Functional Mobility, Functional Transfers,, and Instrumental Activities of Daily Living.   Recommending Home w/ 24hr assist & HHOT vs SNF if unable to progress to Mod I upon discharge as patient functioning well below baseline, demonstrates good rehab potential and unable to return home due to limited or no family support, inability to negotiate stairs to enter home/bedroom/bathroom, burden of

## 2025-04-17 NOTE — PROGRESS NOTES
Incentive Spirometry education and demonstration completed by Respiratory Therapy.  Turning over to Nursing for routine follow-up.  Minimum Predicted Vital Capacity - 918 mL.  Patient's Actual Vital Capacity - 2500 mL.

## 2025-04-17 NOTE — PROGRESS NOTES
[x]Heel and toe raises (ankle DF/PF)   [x]Gluteal isometrics  [x]Quadricep Isometrics  [x]RLE onlySLR    Disease Specific Education for:    [x]WBAT RLE with knee immobilizer blocked in ext.   [x]Brace locked in extension when walking and may unlock brace and bend knee to 30 degrees at tolerated when seated.  [x]LE therex and review of precautions for WB and brace wear, fastened brace prior to mobilization as on entry patient had straps unlocked. Explained to patient how to engage locks and unlock to allow PROM 0-30 degrees. Emphasized no AROM at LEft knee and brace on and locked with all mobility and that clips need to be on when sleeping to prevent ROM in left knee. Advised patient not to unclip KI with patient verbalizing understanding and nurse in room.    Patient instructed in and performed the following exercises with:  [x]Verbal cues for exercise angles and set up  []Cues for balance  []Facilitation for technique  [x]Patient verbalized understanding of or demonstrated understanding of instruction. used crutch as leg       Functional Outcomes  AM-PAC Inpatient Mobility Raw Score : 13              Education   Barriers To Learning: none  Patient Education: patient educated on plan of care, discharge recommendations, goals, PT role and benefits, precautions, weight-bearing education, HEP, disease specific education, general safety, functional mobility training, proper use of assistive device/equipment, transfer training  Learning Assessment:  patient verbalizes understanding, would benefit from continued reinforcement    Patient Disposition at end of session:  patient left in bed, bed alarm in place, call light within reach, gait belt, patient at risk for falls, nurse notified, family/caregiver present, and pt requested to return to bed     Plan   Frequency:5-7 /week   Current Treatment Recommendations: strengthening, ROM, balance training, functional mobility training, transfer training, gait training,  stair training, endurance training, neuromuscular re-education, patient/caregiver education, and home exercise program    Goals   Patient Goals:  Patient states, \"I want to go to a skilled facility for a few weeks.\"      Goals :   To be met in by:4/26/25 unless noted otherwise:  1). Independent with LE Ex x 10 reps to maintain/improve AROM/strength met by 4/21/25. 4/17/25Pt required cues for LE therex.   2). Sit to/from stand:Modified Independent 4/17 patient demo up to min assist for Transfers with FWW.   3). Bed to chair: Modified Independent4/17 patient with walking even transfer distances requires up to max assist with FWW.  4). Gait: Ambulate 150 ft.Modified Independent and use of LRAD. 4/17 pt demo 10 feet x2 with mod to max assist with FWW.   5.) Ascend/descend 15 steps with Modified Independent with use of hand rail unilateral and use of  LRAD       Above goals reviewed on 4/17/2025.  All goals are ongoing at this time unless indicated above.      Therapy Session Time       Individual Group Co-treatment   Time In 1532       Time Out 1602       Minutes 30         Timed Code Treatment Minutes:  30 Minutes  Total Treatment Minutes:30       Electronically Signed By: An Houser, PT

## 2025-04-17 NOTE — TELEPHONE ENCOUNTER
Called patient and spoke with her, I am going to go up to hospital to get some paperwork from her for her Job her  will fax some addition paperwork for us to fill out.

## 2025-04-17 NOTE — PROGRESS NOTES
Patient experiencing pain LLE. Dilaudid and Tylenol given for pain management. Patient has immobilizer on LLE. Patient did not feel like getting up out of bed to ambulate this morning.

## 2025-04-17 NOTE — CARE COORDINATION
Discharge Planning Note Re: Skilled Nursing     CM/SW noted consult for discharge planning. Chart reviewed. Noted recommendations for SNF. CM met with patient.  Introduced self and explained role of CM and discharge planning.    Patient is agreeable to SNF on dc.     [facility] of choice list was provided with basic dialogue that supports the patient's individualized plan of care/goals, treatment preferences and shares the quality data associated with the providers. [x] Yes [] No.  Patient has reviewed the provided list and made selections.    1000-Referral made to [facility] and they stated they are not in newtwork.     1053-Referred to [facility]. Spoke to Inga.    1530- [facility] can accept pt when medically stable for d/c.    CM/SW will follow-up on referrals and provide any additional documentation necessary to facilitate placement.

## 2025-04-18 PROCEDURE — 1200000000 HC SEMI PRIVATE

## 2025-04-18 PROCEDURE — 97535 SELF CARE MNGMENT TRAINING: CPT

## 2025-04-18 PROCEDURE — 2500000003 HC RX 250 WO HCPCS: Performed by: ORTHOPAEDIC SURGERY

## 2025-04-18 PROCEDURE — 6370000000 HC RX 637 (ALT 250 FOR IP): Performed by: ORTHOPAEDIC SURGERY

## 2025-04-18 PROCEDURE — 97116 GAIT TRAINING THERAPY: CPT

## 2025-04-18 PROCEDURE — 97530 THERAPEUTIC ACTIVITIES: CPT

## 2025-04-18 PROCEDURE — 99024 POSTOP FOLLOW-UP VISIT: CPT | Performed by: PHYSICIAN ASSISTANT

## 2025-04-18 PROCEDURE — 6360000002 HC RX W HCPCS: Performed by: ORTHOPAEDIC SURGERY

## 2025-04-18 RX ADMIN — KETOROLAC TROMETHAMINE 30 MG: 30 INJECTION, SOLUTION INTRAMUSCULAR at 08:09

## 2025-04-18 RX ADMIN — POLYETHYLENE GLYCOL 3350 17 G: 17 POWDER, FOR SOLUTION ORAL at 11:40

## 2025-04-18 RX ADMIN — ENOXAPARIN SODIUM 40 MG: 100 INJECTION SUBCUTANEOUS at 08:10

## 2025-04-18 RX ADMIN — SODIUM CHLORIDE, PRESERVATIVE FREE 10 ML: 5 INJECTION INTRAVENOUS at 08:10

## 2025-04-18 RX ADMIN — KETOROLAC TROMETHAMINE 30 MG: 30 INJECTION, SOLUTION INTRAMUSCULAR at 15:14

## 2025-04-18 RX ADMIN — HYDROMORPHONE HYDROCHLORIDE 0.5 MG: 1 INJECTION, SOLUTION INTRAMUSCULAR; INTRAVENOUS; SUBCUTANEOUS at 12:56

## 2025-04-18 RX ADMIN — HYDROMORPHONE HYDROCHLORIDE 0.5 MG: 1 INJECTION, SOLUTION INTRAMUSCULAR; INTRAVENOUS; SUBCUTANEOUS at 16:00

## 2025-04-18 RX ADMIN — HYDROMORPHONE HYDROCHLORIDE 0.5 MG: 1 INJECTION, SOLUTION INTRAMUSCULAR; INTRAVENOUS; SUBCUTANEOUS at 08:09

## 2025-04-18 RX ADMIN — ACETAMINOPHEN 650 MG: 325 TABLET ORAL at 15:14

## 2025-04-18 RX ADMIN — ACETAMINOPHEN 650 MG: 325 TABLET ORAL at 20:24

## 2025-04-18 RX ADMIN — ONDANSETRON 4 MG: 2 INJECTION, SOLUTION INTRAMUSCULAR; INTRAVENOUS at 08:27

## 2025-04-18 RX ADMIN — OXYCODONE HYDROCHLORIDE 10 MG: 5 TABLET ORAL at 05:19

## 2025-04-18 RX ADMIN — OXYCODONE HYDROCHLORIDE 10 MG: 5 TABLET ORAL at 11:38

## 2025-04-18 ASSESSMENT — PAIN DESCRIPTION - FREQUENCY
FREQUENCY: CONTINUOUS

## 2025-04-18 ASSESSMENT — PAIN SCALES - GENERAL
PAINLEVEL_OUTOF10: 8
PAINLEVEL_OUTOF10: 0
PAINLEVEL_OUTOF10: 7
PAINLEVEL_OUTOF10: 7
PAINLEVEL_OUTOF10: 9
PAINLEVEL_OUTOF10: 7
PAINLEVEL_OUTOF10: 9
PAINLEVEL_OUTOF10: 3
PAINLEVEL_OUTOF10: 7
PAINLEVEL_OUTOF10: 9
PAINLEVEL_OUTOF10: 6
PAINLEVEL_OUTOF10: 7
PAINLEVEL_OUTOF10: 6
PAINLEVEL_OUTOF10: 7

## 2025-04-18 ASSESSMENT — PAIN DESCRIPTION - LOCATION
LOCATION: KNEE
LOCATION: LEG
LOCATION: KNEE
LOCATION: LEG;KNEE
LOCATION: KNEE;LEG
LOCATION: KNEE
LOCATION: LEG;KNEE

## 2025-04-18 ASSESSMENT — PAIN DESCRIPTION - PAIN TYPE
TYPE: ACUTE PAIN;SURGICAL PAIN

## 2025-04-18 ASSESSMENT — PAIN DESCRIPTION - DESCRIPTORS
DESCRIPTORS: SHARP;DISCOMFORT;ACHING
DESCRIPTORS: ACHING;SHARP;DISCOMFORT;THROBBING
DESCRIPTORS: ACHING;TIGHTNESS
DESCRIPTORS: ACHING;THROBBING
DESCRIPTORS: DISCOMFORT;ACHING;THROBBING
DESCRIPTORS: DISCOMFORT;SHARP;ACHING

## 2025-04-18 ASSESSMENT — PAIN - FUNCTIONAL ASSESSMENT
PAIN_FUNCTIONAL_ASSESSMENT: ACTIVITIES ARE NOT PREVENTED
PAIN_FUNCTIONAL_ASSESSMENT: ACTIVITIES ARE NOT PREVENTED
PAIN_FUNCTIONAL_ASSESSMENT: PREVENTS OR INTERFERES SOME ACTIVE ACTIVITIES AND ADLS
PAIN_FUNCTIONAL_ASSESSMENT: ACTIVITIES ARE NOT PREVENTED

## 2025-04-18 ASSESSMENT — PAIN DESCRIPTION - ONSET
ONSET: ON-GOING

## 2025-04-18 ASSESSMENT — PAIN DESCRIPTION - ORIENTATION
ORIENTATION: LEFT
ORIENTATION: RIGHT

## 2025-04-18 NOTE — PROGRESS NOTES
Department of Orthopedic Surgery  Attending Progress Note        Subjective:  No complaints and wondering where she will be accepted to skilled care, apparently she was not notified that she had been accepted at [facility]  See rounding tab, VSS    PHYSICAL EXAM:    Orientation:  alert and oriented to person, place and time    Left Lower Extremity    Incision:  dressing in place, clean, dry, and intact    Lower Extremity Motor :   Quadriceps:  2/5  Extensor hallucis:  5/5  Dorsiflexion:  5/5  Plantarflexion:  5/5    Lower Extremity Sensory:  Tibial Nerve:  intact  Superficial Peroneal Nerve:  intact  Deep Peroneal Nerve:  intact    Pulses:  Dorsalis Pedis:  2    Abnormal Exam findings:  none    Brace: T scope knee brace    LABS:    HgB:    Lab Results   Component Value Date/Time    HGB 12.5 04/14/2025 12:46 PM       ASSESSMENT AND PLAN:    Post operative day 3 status post repair of right knee intrasubstance patella tendon rupture.    1:  Weight bearing as tolerated with her knee brace locked in extension.  She can unlock the knee brace and passively bend the knee up to 30 degrees.  No active knee extension exercises and no straight leg raises x 6 weeks.  2:  Deep venous thrombosis prophylaxis -Lovenox while in the hospital.  She will transition to aspirin 81 mg twice daily at discharge  3:  Continue physical therapy and Occupational Therapy  4:  D/C Plan:  Skilled Nursing Facility  5:  Pain Control: Continue current pain management.  6: Follow-up with Dr. Hsu in 2 weeks and we will increase her flexion to 60 degrees passively at that point with the goal of achieving 90 degrees of passive flexion by 6 weeks and she can then begin active knee extension and active quad exercises at that point.

## 2025-04-18 NOTE — PROGRESS NOTES
with:  [x]Verbal cues for exercise angles and set up  []Cues for balance  []Facilitation for technique  [x]Patient verbalized understanding of or demonstrated understanding of instruction. used crutch as leg       Functional Outcomes  AM-PAC Inpatient Mobility Raw Score : 16              Education   Barriers To Learning: none  Patient Education: patient educated on plan of care, discharge recommendations, goals, PT role and benefits, precautions, weight-bearing education, HEP, disease specific education, general safety, functional mobility training, proper use of assistive device/equipment, transfer training  Learning Assessment:  patient verbalizes understanding, would benefit from continued reinforcement    Patient Disposition at end of session:   patient with OT in bathroom at end of PT session     Plan   Frequency:5-7 /week   Current Treatment Recommendations: strengthening, ROM, balance training, functional mobility training, transfer training, gait training, stair training, endurance training, neuromuscular re-education, patient/caregiver education, and home exercise program    Goals   Patient Goals:  Patient states, \"I want to go to a skilled facility for a few weeks.\"      Goals :   To be met in by:4/26/25 unless noted otherwise:  1). Independent with LE Ex x 10 reps to maintain/improve AROM/strength met by 4/21/25. 4/17/25Pt required cues for LE therex.   2). Sit to/from stand:Modified Independent 4/17 patient demo up to min assist for Transfers with FWW. 4/18/25 Pt demo up to min assist with transfers for LLE management.   3). Bed to chair: Modified Independent4/17 patient with walking even transfer distances requires up to max assist with FWW. 4/18/25 Pt demo up to min assist with transfers for LLE management.   4). Gait: Ambulate 150 ft.Modified Independent and use of LRAD. 4/17 pt demo 10 feet x2 with mod to max assist with FWW. 4/18/25 Pt with WC follow KI lokce din ext able to walk up to 30 feet  with chair follow and CGA to min assist with pain limiting.   5.) Ascend/descend 15 steps with Modified Independent with use of hand rail unilateral and use of  LRAD       Above goals reviewed on 4/18/2025.  All goals are ongoing at this time unless indicated above.      Therapy Session Time       Individual Group Co-treatment   Time In 1402       Time Out 1425       Minutes 23         Timed Code Treatment Minutes:  23 Minutes  Total Treatment Minutes:23       Electronically Signed By: An Houser PT

## 2025-04-18 NOTE — DISCHARGE INSTR - DIET

## 2025-04-18 NOTE — PLAN OF CARE
Problem: Discharge Planning  Goal: Discharge to home or other facility with appropriate resources  4/18/2025 1231 by Vicki Martin, RN  Outcome: Progressing  4/17/2025 2247 by Rajni Mckenzie RN  Outcome: Progressing     Problem: Pain  Goal: Verbalizes/displays adequate comfort level or baseline comfort level  Outcome: Progressing     Problem: Safety - Adult  Goal: Free from fall injury  Outcome: Progressing     Problem: ABCDS Injury Assessment  Goal: Absence of physical injury  Outcome: Progressing

## 2025-04-18 NOTE — DISCHARGE SUMMARY
Department of Orthopedic Surgery  Physician Assistant   Discharge Summary    The Argentina Barrios is a 39 y.o. female admitted for left patella tendon repair.  Argentina Barrios was admitted to the floor following Her recovery in the PACU.     Discharge Diagnosis  left patella tendon repair    Current Inpatient Medications    Current Facility-Administered Medications: sodium chloride flush 0.9 % injection 5-40 mL, 5-40 mL, IntraVENous, 2 times per day  sodium chloride flush 0.9 % injection 5-40 mL, 5-40 mL, IntraVENous, PRN  0.9 % sodium chloride infusion, , IntraVENous, PRN  acetaminophen (TYLENOL) tablet 650 mg, 650 mg, Oral, Q6H  enoxaparin (LOVENOX) injection 40 mg, 40 mg, SubCUTAneous, Daily  LORazepam (ATIVAN) tablet 0.5 mg, 0.5 mg, Oral, Once PRN  potassium chloride (KLOR-CON M) extended release tablet 40 mEq, 40 mEq, Oral, PRN **OR** potassium bicarb-citric acid (EFFER-K) effervescent tablet 40 mEq, 40 mEq, Oral, PRN **OR** potassium chloride 10 mEq/100 mL IVPB (Peripheral Line), 10 mEq, IntraVENous, PRN  magnesium sulfate 2000 mg in 50 mL IVPB premix, 2,000 mg, IntraVENous, PRN  ondansetron (ZOFRAN-ODT) disintegrating tablet 4 mg, 4 mg, Oral, Q8H PRN **OR** ondansetron (ZOFRAN) injection 4 mg, 4 mg, IntraVENous, Q6H PRN  polyethylene glycol (GLYCOLAX) packet 17 g, 17 g, Oral, Daily PRN  acetaminophen (TYLENOL) tablet 650 mg, 650 mg, Oral, Q6H PRN **OR** acetaminophen (TYLENOL) suppository 650 mg, 650 mg, Rectal, Q6H PRN  melatonin tablet 3 mg, 3 mg, Oral, Nightly  ketorolac (TORADOL) injection 30 mg, 30 mg, IntraVENous, Q6H PRN  oxyCODONE (ROXICODONE) immediate release tablet 5 mg, 5 mg, Oral, Q4H PRN **OR** oxyCODONE (ROXICODONE) immediate release tablet 10 mg, 10 mg, Oral, Q4H PRN  HYDROmorphone (DILAUDID) injection 0.25 mg, 0.25 mg, IntraVENous, Q3H PRN **OR** HYDROmorphone (DILAUDID) injection 0.5 mg, 0.5 mg, IntraVENous, Q3H PRN  hydrOXYzine pamoate (VISTARIL) capsule 25 mg, 25 mg, Oral,

## 2025-04-18 NOTE — CARE COORDINATION
Discharge Planning Note:    DEREJE spoke with Nidia at [facility] and she states that pt was accepted but pre cert has not yet been started.     DEREJE completed LEONID/HENS and faxed to [facility] so pre cert can be initiated today.

## 2025-04-18 NOTE — PROGRESS NOTES
[facility] - Rehabilitation Department       Occupational Therapy  3221/3221-01  [facility] 3 PROGRESSIVE CARE    [] Initial Evaluation            [x] Daily Treatment Note         [] Discharge Summary      Patient: Argentina Barrios   : 1985   MRN: [id number]   Date of Service:  2025  Discharge Recommendations: Home w/ 24hr assist & HHOT vs SNF if unable to progress to Mod I     AM-PAC Inpatient Daily Activity Raw Score: 19    DME Required For Discharge: rolling walker, tub transfer bench, grab bars - toilet, grab bars - shower, reacher, sock-aid, long-handle sponge    Therapy discharge recommendations take into account each patient's current medical complexities and are subject to input/oversight from the patient's healthcare team.     Barriers to Home Discharge:   [x] Steps to access home entry or bed/bath:   [x] Unable to transfer, ambulate, or propel wheelchair household distances without assist   [x] Limited available assist at home upon discharge    [x] Patient or family requests d/c to post-acute facility    [] Poor cognition/safety awareness for d/c to home alone    []Unable to maintain ordered weight bearing status    [] Patient with salient signs of long-standing immobility   [x] Patient is at risk for falls due to: decreased activity tolerance   [x] Other: decrease ADL & fx mobility    If pt is unable to be seen after this session, please let this note serve as discharge summary.  Please see case management note for discharge disposition.  Thank you.    Admitting Diagnosis:  Patellar tendon rupture, left, initial encounter  Referring Physician: Marcus Hsu MD   Current Admission Summary: Marcus Hsu MD H&P :  \"DIAGNOSIS:  Left patella tendon rupture     CHIEF COMPLAINT:  Left knee pain and unable to ambulate     History Obtained From:  patient, electronic medical record     HISTORY OF PRESENT ILLNESS:       The patient is a 39 y.o. female with fall while walking    __________________________________________________________________________________________________________________________________________________________    General Subjective: Pt agreeable to OT treatment & declining up to chair on this date    Objective:     Pain: \"9/10\" L knee - RN administering Dilaudid prior to session  Pain Interventions: RN notified, ice applied, repositioned , and therapy activities modified Patient reports pain is tolerable for therapy       Vitals: denied dizziness ed  Pt Position BP (mmHg) HR (bpm) SpO2 (%) on RA  RR Comments   Semi fowlers at end of session 100/62 [phone]                               Lines, Drains, & Tubes: Purewick External Catheter and IV    Activity Tolerance : Pt tolerated OT tx fair, Patient with high pain in L knee on this date needing rest to recover  Activities of Daily Living   Basic Activities of Daily Living  Grooming: contact guard assistance  Grooming Comments: in stance at sink w/ RW washing hands  Lower Extremity Dressing: minimal assistance  Dressing Comments: use of reacher & sock aid; CGA for standing balance during brief mgnt; Min A for sock aid to maintain ordered precautions  Toileting: contact guard assistance.    Toileting Comments: assist for standing balance during brief mgnt & pericare  Instrumental Activities of Daily Living  No IADL completed on this date.    Functional Mobility   Bed Mobility:  Sit to Supine: stand by assistance  Scooting: stand by assistance  Comments: use of leg   Transfers:  Sit to stand transfer:contact guard assistance  Stand to sit transfer: contact guard assistance  Toilet transfer: contact guard assistance  Toilet transfer equipment: grab bars, walker  Comments: RW  Functional Mobility  Functional Mobility Activity: toilet>sink>bed  Device Use: rolling walker  Required Assistance: contact guard assistance  Comment: RW    Balance:  SITTING BALANCE  [] Patient requires use of bed rail for static/dynamic

## 2025-04-19 PROCEDURE — 1200000000 HC SEMI PRIVATE

## 2025-04-19 PROCEDURE — 99024 POSTOP FOLLOW-UP VISIT: CPT | Performed by: STUDENT IN AN ORGANIZED HEALTH CARE EDUCATION/TRAINING PROGRAM

## 2025-04-19 PROCEDURE — 6360000002 HC RX W HCPCS: Performed by: ORTHOPAEDIC SURGERY

## 2025-04-19 PROCEDURE — 6370000000 HC RX 637 (ALT 250 FOR IP): Performed by: STUDENT IN AN ORGANIZED HEALTH CARE EDUCATION/TRAINING PROGRAM

## 2025-04-19 PROCEDURE — 6370000000 HC RX 637 (ALT 250 FOR IP): Performed by: ORTHOPAEDIC SURGERY

## 2025-04-19 PROCEDURE — 97110 THERAPEUTIC EXERCISES: CPT

## 2025-04-19 PROCEDURE — 97530 THERAPEUTIC ACTIVITIES: CPT

## 2025-04-19 PROCEDURE — 97535 SELF CARE MNGMENT TRAINING: CPT

## 2025-04-19 PROCEDURE — 97116 GAIT TRAINING THERAPY: CPT

## 2025-04-19 PROCEDURE — 2500000003 HC RX 250 WO HCPCS: Performed by: ORTHOPAEDIC SURGERY

## 2025-04-19 RX ORDER — DOCUSATE SODIUM 100 MG/1
100 CAPSULE, LIQUID FILLED ORAL DAILY
Status: DISCONTINUED | OUTPATIENT
Start: 2025-04-19 | End: 2025-04-25 | Stop reason: HOSPADM

## 2025-04-19 RX ADMIN — HYDROMORPHONE HYDROCHLORIDE 0.5 MG: 1 INJECTION, SOLUTION INTRAMUSCULAR; INTRAVENOUS; SUBCUTANEOUS at 19:53

## 2025-04-19 RX ADMIN — HYDROMORPHONE HYDROCHLORIDE 0.5 MG: 1 INJECTION, SOLUTION INTRAMUSCULAR; INTRAVENOUS; SUBCUTANEOUS at 14:18

## 2025-04-19 RX ADMIN — HYDROMORPHONE HYDROCHLORIDE 0.5 MG: 1 INJECTION, SOLUTION INTRAMUSCULAR; INTRAVENOUS; SUBCUTANEOUS at 10:17

## 2025-04-19 RX ADMIN — ACETAMINOPHEN 650 MG: 325 TABLET ORAL at 17:58

## 2025-04-19 RX ADMIN — DOCUSATE SODIUM 100 MG: 100 CAPSULE, LIQUID FILLED ORAL at 12:30

## 2025-04-19 RX ADMIN — POLYETHYLENE GLYCOL 3350 17 G: 17 POWDER, FOR SOLUTION ORAL at 10:18

## 2025-04-19 RX ADMIN — ACETAMINOPHEN 650 MG: 325 TABLET ORAL at 04:22

## 2025-04-19 RX ADMIN — ENOXAPARIN SODIUM 40 MG: 100 INJECTION SUBCUTANEOUS at 09:37

## 2025-04-19 RX ADMIN — OXYCODONE HYDROCHLORIDE 10 MG: 5 TABLET ORAL at 17:58

## 2025-04-19 RX ADMIN — SODIUM CHLORIDE, PRESERVATIVE FREE 10 ML: 5 INJECTION INTRAVENOUS at 00:20

## 2025-04-19 RX ADMIN — SODIUM CHLORIDE, PRESERVATIVE FREE 10 ML: 5 INJECTION INTRAVENOUS at 19:53

## 2025-04-19 RX ADMIN — KETOROLAC TROMETHAMINE 30 MG: 30 INJECTION, SOLUTION INTRAMUSCULAR at 10:17

## 2025-04-19 RX ADMIN — ACETAMINOPHEN 650 MG: 325 TABLET ORAL at 09:38

## 2025-04-19 RX ADMIN — KETOROLAC TROMETHAMINE 30 MG: 30 INJECTION, SOLUTION INTRAMUSCULAR at 00:20

## 2025-04-19 RX ADMIN — Medication 3 MG: at 00:20

## 2025-04-19 RX ADMIN — SODIUM CHLORIDE, PRESERVATIVE FREE 10 ML: 5 INJECTION INTRAVENOUS at 10:19

## 2025-04-19 RX ADMIN — OXYCODONE HYDROCHLORIDE 10 MG: 5 TABLET ORAL at 22:01

## 2025-04-19 RX ADMIN — Medication 3 MG: at 22:01

## 2025-04-19 RX ADMIN — ACETAMINOPHEN 650 MG: 325 TABLET ORAL at 22:01

## 2025-04-19 RX ADMIN — OXYCODONE HYDROCHLORIDE 10 MG: 5 TABLET ORAL at 07:50

## 2025-04-19 RX ADMIN — HYDROMORPHONE HYDROCHLORIDE 0.5 MG: 1 INJECTION, SOLUTION INTRAMUSCULAR; INTRAVENOUS; SUBCUTANEOUS at 04:23

## 2025-04-19 RX ADMIN — HYDROMORPHONE HYDROCHLORIDE 0.5 MG: 1 INJECTION, SOLUTION INTRAMUSCULAR; INTRAVENOUS; SUBCUTANEOUS at 00:20

## 2025-04-19 ASSESSMENT — PAIN SCALES - GENERAL
PAINLEVEL_OUTOF10: 6
PAINLEVEL_OUTOF10: 9
PAINLEVEL_OUTOF10: 7
PAINLEVEL_OUTOF10: 10
PAINLEVEL_OUTOF10: 6
PAINLEVEL_OUTOF10: 4
PAINLEVEL_OUTOF10: 8
PAINLEVEL_OUTOF10: 9
PAINLEVEL_OUTOF10: 2
PAINLEVEL_OUTOF10: 8
PAINLEVEL_OUTOF10: 10
PAINLEVEL_OUTOF10: 10
PAINLEVEL_OUTOF10: 6
PAINLEVEL_OUTOF10: 8
PAINLEVEL_OUTOF10: 9
PAINLEVEL_OUTOF10: 3
PAINLEVEL_OUTOF10: 0

## 2025-04-19 ASSESSMENT — PAIN DESCRIPTION - DESCRIPTORS
DESCRIPTORS: GNAWING;PRESSURE
DESCRIPTORS: ACHING
DESCRIPTORS: ACHING;THROBBING
DESCRIPTORS: JABBING;POUNDING
DESCRIPTORS: ACHING
DESCRIPTORS: POUNDING;ACHING;THROBBING
DESCRIPTORS: THROBBING;ACHING;SHARP

## 2025-04-19 ASSESSMENT — PAIN - FUNCTIONAL ASSESSMENT
PAIN_FUNCTIONAL_ASSESSMENT: ACTIVITIES ARE NOT PREVENTED

## 2025-04-19 ASSESSMENT — PAIN DESCRIPTION - FREQUENCY
FREQUENCY: CONTINUOUS

## 2025-04-19 ASSESSMENT — PAIN DESCRIPTION - ONSET
ONSET: ON-GOING

## 2025-04-19 ASSESSMENT — PAIN DESCRIPTION - PAIN TYPE
TYPE: ACUTE PAIN
TYPE: ACUTE PAIN;SURGICAL PAIN
TYPE: ACUTE PAIN;SURGICAL PAIN
TYPE: ACUTE PAIN
TYPE: ACUTE PAIN;SURGICAL PAIN
TYPE: ACUTE PAIN;SURGICAL PAIN

## 2025-04-19 ASSESSMENT — PAIN DESCRIPTION - LOCATION
LOCATION: KNEE
LOCATION: LEG
LOCATION: LEG
LOCATION: KNEE
LOCATION: LEG
LOCATION: LEG;KNEE
LOCATION: LEG

## 2025-04-19 ASSESSMENT — PAIN DESCRIPTION - ORIENTATION
ORIENTATION: LEFT

## 2025-04-19 NOTE — PROGRESS NOTES
Occupational Therapy    [facility] - Rehabilitation Department       Occupational Therapy  3221/3221-01  [facility] 3 PROGRESSIVE CARE    [] Initial Evaluation            [x] Daily Treatment Note         [] Discharge Summary      Patient: Argentina Barrios   : 1985   MRN: [id number]   Date of Service:  2025  Discharge Recommendations: SNF     AM-PAC Inpatient Daily Activity Raw Score: 17    DME Required For Discharge: rolling walker, tub transfer bench, grab bars - toilet, reacher, sock-aid, long-handle sponge    Therapy discharge recommendations take into account each patient's current medical complexities and are subject to input/oversight from the patient's healthcare team.     Barriers to Home Discharge:   [x] Steps to access home entry or bed/bath:   [x] Unable to transfer, ambulate, or propel wheelchair household distances without assist   [x] Limited available assist at home upon discharge    [x] Patient or family requests d/c to post-acute facility    [] Poor cognition/safety awareness for d/c to home alone    []Unable to maintain ordered weight bearing status    [] Patient with salient signs of long-standing immobility   [x] Patient is at risk for falls due to: impaired balance   [] Other:    If pt is unable to be seen after this session, please let this note serve as discharge summary.  Please see case management note for discharge disposition.  Thank you.  Admitting Diagnosis:  Patellar tendon rupture, left, initial encounter  Referring Physician: Marcus Hus MD   Current Admission Summary: Marcus Hsu MD H&P :  \"DIAGNOSIS:  Left patella tendon rupture     CHIEF COMPLAINT:  Left knee pain and unable to ambulate     History Obtained From:  patient, electronic medical record     HISTORY OF PRESENT ILLNESS:       The patient is a 39 y.o. female with fall while walking on stairs .  She missed a step and stumbled causing a popping sensation in the anterior aspect

## 2025-04-19 NOTE — PLAN OF CARE
Problem: Discharge Planning  Goal: Discharge to home or other facility with appropriate resources  4/19/2025 1130 by Bernice Laguna RN  Outcome: Progressing  Flowsheets (Taken 4/19/2025 1130)  Discharge to home or other facility with appropriate resources:   Identify barriers to discharge with patient and caregiver   Identify discharge learning needs (meds, wound care, etc)  4/18/2025 2341 by Sol Hobbs RN  Outcome: Progressing     Problem: Pain  Goal: Verbalizes/displays adequate comfort level or baseline comfort level  4/19/2025 1130 by Bernice Laguna RN  Outcome: Progressing  Flowsheets (Taken 4/19/2025 1130)  Verbalizes/displays adequate comfort level or baseline comfort level:   Encourage patient to monitor pain and request assistance   Administer analgesics based on type and severity of pain and evaluate response   Assess pain using appropriate pain scale   Implement non-pharmacological measures as appropriate and evaluate response  4/18/2025 2341 by Sol Hobbs RN  Outcome: Progressing     Problem: Safety - Adult  Goal: Free from fall injury  4/19/2025 1130 by Bernice Laguna RN  Outcome: Progressing  Flowsheets (Taken 4/19/2025 1130)  Free From Fall Injury: Instruct family/caregiver on patient safety  4/18/2025 2341 by Sol Hobbs RN  Outcome: Progressing     Problem: ABCDS Injury Assessment  Goal: Absence of physical injury  Outcome: Progressing  Flowsheets (Taken 4/19/2025 1130)  Absence of Physical Injury: Implement safety measures based on patient assessment

## 2025-04-19 NOTE — PLAN OF CARE
Problem: Discharge Planning  Goal: Discharge to home or other facility with appropriate resources  4/18/2025 2341 by Sol Hobbs RN  Outcome: Progressing  4/18/2025 1231 by Vicki Martin RN  Outcome: Progressing     Problem: Pain  Goal: Verbalizes/displays adequate comfort level or baseline comfort level  4/18/2025 2341 by Sol Hobbs RN  Outcome: Progressing  4/18/2025 1231 by Vicki Martin RN  Outcome: Progressing     Problem: Safety - Adult  Goal: Free from fall injury  4/18/2025 2341 by Sol Hobbs RN  Outcome: Progressing  4/18/2025 1231 by Vicki Martin RN  Outcome: Progressing     Problem: ABCDS Injury Assessment  Goal: Absence of physical injury  4/18/2025 1231 by Vicki Martin RN  Outcome: Progressing

## 2025-04-19 NOTE — PROGRESS NOTES
[facility] - Rehabilitation Department      Physical Therapy  3221/3221-01  [facility] 3 PROGRESSIVE CARE    [] Initial Evaluation            [x] Daily Treatment Note         [] Discharge Summary      Patient: Argentina Barrios   : 1985   MRN: [id number]   Date of Service:  2025   Discharge Recommendations: Home with 24  hr Supv/assist vs SNF if unable to progress to [facility]. Pt requesting SNF   [facility] Raw Score:   AM-PAC Inpatient Mobility Raw Score : 17            DME Required For Discharge:   rolling walker  Therapy discharge recommendations take into account each patient's current medical complexities and are subject to input/oversight from the patient's healthcare team.   Barriers to Home Discharge:   [x] Steps to access home entry or bed/bath:   [x] Unable to transfer, ambulate, or propel wheelchair household distances without assist   [] Limited available assist at home upon discharge    [] Patient or family requests d/c to post-acute facility    [] Poor cognition/safety awareness for d/c to home alone    []Unable to maintain ordered weight bearing status    [] Patient with salient signs of long-standing immobility   [x] Patient is at risk for falls due to: unsteady gait with increased assist to Max with walking around bed with patient with increasing unsteady gait and increased pain and weakness per patient.    [] Other:  If pt is unable to be seen after this session, please let this note serve as discharge summary.  Please see case management note for discharge disposition.  Thank you.  Admitting Diagnosis: Patellar tendon rupture, left, initial encounter  Ordering Physician: Marcus Hsu MD  Current Admission Summary:   25 H&P Marcus Hsu MD,  \"DIAGNOSIS:  Left patella tendon rupture     CHIEF COMPLAINT:  Left knee pain and unable to ambulateHISTORY OF PRESENT ILLNESS:       The patient is a 39 y.o. female with fall while walking on stairs .  She missed a step

## 2025-04-19 NOTE — PROGRESS NOTES
Progress Note    Patient:  Argentina Barrios  YOB: 1985     39 y.o. female    Subjective:  Patient seen and examined  Patent up in chair with brace on. Ace wrap removed. Reports she almost fell last night but was caught by her nurse. Had a little pain last night, controlled    Objective:   Vitals:    04/19/25 1017   BP:    Pulse:    Resp: 18   Temp:    SpO2:      Gen: NAD, cooperative     Left lower extremity: Brace on locked in extension, wound clean and dry. Dressing in place. Compartments soft. EHL/FHL/TA/GS complex motor intact. Sural, saphenous, superificial/deep peroneal, and plantar nerve distribution sensation grossly intact. Dorsalis pedis/posterior tibial pulses 2+ with BCR.        No results for input(s): \"WBC\", \"HGB\", \"HCT\", \"PLT\", \"INR\", \"NA\", \"K\", \"BUN\", \"CREATININE\", \"GLUCOSE\" in the last 72 hours.    Invalid input(s): \"PT\", \"PTT\"       Meds: Lovenox  See rec for complete list    Impression/plan: 39 y.o. female s/p L patellar tendon repair with Dr. Hsu, POD#4 (DOS 4/15/25)    1:  Weight bearing as tolerated with her knee brace locked in extension.  She can unlock the knee brace and passively bend the knee up to 30 degrees.  No active knee extension exercises and no straight leg raises x 6 weeks. Ace wrap removed.  2:  Deep venous thrombosis prophylaxis -Lovenox while in the hospital.  She will transition to aspirin 81 mg twice daily at discharge  3:  Continue physical therapy and Occupational Therapy  4:  D/C Plan:  Skilled Nursing Facility, awaiting precert  5:  Pain Control: Continue current pain management.  6: Follow-up with Dr. Hsu 2 weeks after surgery and we will increase her flexion to 60 degrees passively at that point with the goal of achieving 90 degrees of passive flexion by 6 weeks and she can then begin active knee extension and active quad exercises at that point.    -Please call with any questions    Norberto Menezes, DO  Orthopedic Surgery and Sports

## 2025-04-20 PROCEDURE — 97535 SELF CARE MNGMENT TRAINING: CPT

## 2025-04-20 PROCEDURE — 97530 THERAPEUTIC ACTIVITIES: CPT

## 2025-04-20 PROCEDURE — 2500000003 HC RX 250 WO HCPCS: Performed by: ORTHOPAEDIC SURGERY

## 2025-04-20 PROCEDURE — 6360000002 HC RX W HCPCS: Performed by: ORTHOPAEDIC SURGERY

## 2025-04-20 PROCEDURE — 97116 GAIT TRAINING THERAPY: CPT

## 2025-04-20 PROCEDURE — 1200000000 HC SEMI PRIVATE

## 2025-04-20 PROCEDURE — 6370000000 HC RX 637 (ALT 250 FOR IP): Performed by: STUDENT IN AN ORGANIZED HEALTH CARE EDUCATION/TRAINING PROGRAM

## 2025-04-20 PROCEDURE — 6370000000 HC RX 637 (ALT 250 FOR IP): Performed by: ORTHOPAEDIC SURGERY

## 2025-04-20 PROCEDURE — 99024 POSTOP FOLLOW-UP VISIT: CPT | Performed by: STUDENT IN AN ORGANIZED HEALTH CARE EDUCATION/TRAINING PROGRAM

## 2025-04-20 RX ADMIN — ACETAMINOPHEN 650 MG: 325 TABLET ORAL at 17:14

## 2025-04-20 RX ADMIN — POLYETHYLENE GLYCOL 3350 17 G: 17 POWDER, FOR SOLUTION ORAL at 08:19

## 2025-04-20 RX ADMIN — Medication 3 MG: at 22:10

## 2025-04-20 RX ADMIN — OXYCODONE HYDROCHLORIDE 10 MG: 5 TABLET ORAL at 17:14

## 2025-04-20 RX ADMIN — HYDROMORPHONE HYDROCHLORIDE 0.5 MG: 1 INJECTION, SOLUTION INTRAMUSCULAR; INTRAVENOUS; SUBCUTANEOUS at 20:28

## 2025-04-20 RX ADMIN — ENOXAPARIN SODIUM 40 MG: 100 INJECTION SUBCUTANEOUS at 08:19

## 2025-04-20 RX ADMIN — SODIUM CHLORIDE, PRESERVATIVE FREE 10 ML: 5 INJECTION INTRAVENOUS at 22:11

## 2025-04-20 RX ADMIN — OXYCODONE HYDROCHLORIDE 10 MG: 5 TABLET ORAL at 22:09

## 2025-04-20 RX ADMIN — ACETAMINOPHEN 650 MG: 325 TABLET ORAL at 04:03

## 2025-04-20 RX ADMIN — ACETAMINOPHEN 650 MG: 325 TABLET ORAL at 22:10

## 2025-04-20 RX ADMIN — DOCUSATE SODIUM 100 MG: 100 CAPSULE, LIQUID FILLED ORAL at 08:19

## 2025-04-20 RX ADMIN — SODIUM CHLORIDE, PRESERVATIVE FREE 10 ML: 5 INJECTION INTRAVENOUS at 08:19

## 2025-04-20 RX ADMIN — HYDROMORPHONE HYDROCHLORIDE 0.5 MG: 1 INJECTION, SOLUTION INTRAMUSCULAR; INTRAVENOUS; SUBCUTANEOUS at 14:15

## 2025-04-20 RX ADMIN — HYDROMORPHONE HYDROCHLORIDE 0.5 MG: 1 INJECTION, SOLUTION INTRAMUSCULAR; INTRAVENOUS; SUBCUTANEOUS at 04:07

## 2025-04-20 RX ADMIN — HYDROMORPHONE HYDROCHLORIDE 0.5 MG: 1 INJECTION, SOLUTION INTRAMUSCULAR; INTRAVENOUS; SUBCUTANEOUS at 23:42

## 2025-04-20 ASSESSMENT — PAIN DESCRIPTION - ORIENTATION
ORIENTATION: LEFT

## 2025-04-20 ASSESSMENT — PAIN SCALES - GENERAL
PAINLEVEL_OUTOF10: 8
PAINLEVEL_OUTOF10: 6
PAINLEVEL_OUTOF10: 0
PAINLEVEL_OUTOF10: 7
PAINLEVEL_OUTOF10: 9
PAINLEVEL_OUTOF10: 8
PAINLEVEL_OUTOF10: 7
PAINLEVEL_OUTOF10: 4
PAINLEVEL_OUTOF10: 8
PAINLEVEL_OUTOF10: 9
PAINLEVEL_OUTOF10: 8
PAINLEVEL_OUTOF10: 4

## 2025-04-20 ASSESSMENT — PAIN DESCRIPTION - LOCATION
LOCATION: KNEE

## 2025-04-20 ASSESSMENT — PAIN DESCRIPTION - DESCRIPTORS
DESCRIPTORS: ACHING
DESCRIPTORS: ACHING;SHARP
DESCRIPTORS: ACHING;THROBBING
DESCRIPTORS: ACHING
DESCRIPTORS: ACHING;DISCOMFORT
DESCRIPTORS: SHARP;THROBBING
DESCRIPTORS: THROBBING;ACHING
DESCRIPTORS: THROBBING;ACHING

## 2025-04-20 ASSESSMENT — PAIN DESCRIPTION - PAIN TYPE
TYPE: ACUTE PAIN
TYPE: ACUTE PAIN;SURGICAL PAIN
TYPE: ACUTE PAIN
TYPE: ACUTE PAIN
TYPE: SURGICAL PAIN

## 2025-04-20 ASSESSMENT — PAIN DESCRIPTION - FREQUENCY
FREQUENCY: CONTINUOUS

## 2025-04-20 ASSESSMENT — PAIN DESCRIPTION - ONSET
ONSET: ON-GOING

## 2025-04-20 NOTE — PROGRESS NOTES
[facility] - Rehabilitation Department      Physical Therapy  3221/3221-01  [facility] 3 PROGRESSIVE CARE    [] Initial Evaluation            [x] Daily Treatment Note         [] Discharge Summary      Patient: Argentina Barrios   : 1985   MRN: [id number]   Date of Service:  2025   Discharge Recommendations: Home with 24  hr Supv/assist vs SNF if unable to progress to [facility]. Pt requesting SNF   [facility] Raw Score:   AM-PAC Inpatient Mobility Raw Score : 17            DME Required For Discharge:   rolling walker  Therapy discharge recommendations take into account each patient's current medical complexities and are subject to input/oversight from the patient's healthcare team.   Barriers to Home Discharge:   [x] Steps to access home entry or bed/bath:   [x] Unable to transfer, ambulate, or propel wheelchair household distances without assist   [] Limited available assist at home upon discharge    [] Patient or family requests d/c to post-acute facility    [] Poor cognition/safety awareness for d/c to home alone    []Unable to maintain ordered weight bearing status    [] Patient with salient signs of long-standing immobility   [x] Patient is at risk for falls due to: unsteady gait with increased assist to Max with walking around bed with patient with increasing unsteady gait and increased pain and weakness per patient.    [] Other:  If pt is unable to be seen after this session, please let this note serve as discharge summary.  Please see case management note for discharge disposition.  Thank you.  Admitting Diagnosis: Patellar tendon rupture, left, initial encounter  Ordering Physician: Marcus Hsu MD  Current Admission Summary:   25 H&P Marcus Hsu MD,  \"DIAGNOSIS:  Left patella tendon rupture     CHIEF COMPLAINT:  Left knee pain and unable to ambulateHISTORY OF PRESENT ILLNESS:       The patient is a 39 y.o. female with fall while walking on stairs .  She missed a step

## 2025-04-20 NOTE — PROGRESS NOTES
Progress Note    Patient:  Argentina Barrios  YOB: 1985     39 y.o. female    Subjective:  Patient seen and examined  Pain improving, tolerating brace.  Had first BM today. Eating is getting better.  She did ask questions today regarding her other knee. It has had popping in the past when using stairs, non-painful, and worried she may be at increased risk of similar injury to the other knee in the future.    Objective:   Vitals:    04/20/25 1130   BP: 105/66   Pulse: 51   Resp: 18   Temp: 98.4 °F (36.9 °C)   SpO2: 100%     Gen: NAD, cooperative     Left lower extremity: Brace on locked in extension, wound clean and dry. Dressing in place. Compartments soft. EHL/FHL/TA/GS complex motor intact. Sural, saphenous, superificial/deep peroneal, and plantar nerve distribution sensation grossly intact. Dorsalis pedis/posterior tibial pulses 2+ with BCR.        No results for input(s): \"WBC\", \"HGB\", \"HCT\", \"PLT\", \"INR\", \"NA\", \"K\", \"BUN\", \"CREATININE\", \"GLUCOSE\" in the last 72 hours.    Invalid input(s): \"PT\", \"PTT\"       Meds: Lovenox  See rec for complete list    Impression/plan: 39 y.o. female s/p L patellar tendon repair with Dr. Hsu, POD#5 (DOS 4/15/25)    1:  Weight bearing as tolerated with her knee brace locked in extension.  She can unlock the knee brace and passively bend the knee up to 30 degrees.  No active knee extension exercises and no straight leg raises x 6 weeks. Ace wrap removed. We did discuss the popping in the other knee. I do not foresee much of an increased risk of similar injury to the right knee in the future, but we did go over exercises to increase her quad strength. I also told her to try and avoid flouroquinolones in the future if possible due to their noted side effect of tendinopathy.  2:  Deep venous thrombosis prophylaxis -Lovenox while in the hospital.  She will transition to aspirin 81 mg twice daily at discharge  3:  Continue physical therapy and Occupational

## 2025-04-20 NOTE — PROGRESS NOTES
Occupational Therapy    [facility] - Rehabilitation Department       Occupational Therapy  3221/3221-01  [facility] 3 PROGRESSIVE CARE    [] Initial Evaluation            [x] Daily Treatment Note         [] Discharge Summary      Patient: Argentina Barrios   : 1985   MRN: [id number]   Date of Service:  2025  Discharge Recommendations: Home with 24 hr assist vs SNF     AM-PAC Inpatient Daily Activity Raw Score: 17    DME Required For Discharge: rolling walker, tub transfer bench, grab bars - toilet, reacher, sock-aid, long-handle sponge    Therapy discharge recommendations take into account each patient's current medical complexities and are subject to input/oversight from the patient's healthcare team.     Barriers to Home Discharge:   [x] Steps to access home entry or bed/bath:   [x] Unable to transfer, ambulate, or propel wheelchair household distances without assist   [x] Limited available assist at home upon discharge    [x] Patient or family requests d/c to post-acute facility    [] Poor cognition/safety awareness for d/c to home alone    []Unable to maintain ordered weight bearing status    [] Patient with salient signs of long-standing immobility   [x] Patient is at risk for falls due to: impaired balance   [] Other:    If pt is unable to be seen after this session, please let this note serve as discharge summary.  Please see case management note for discharge disposition.  Thank you.  Admitting Diagnosis:  Patellar tendon rupture, left, initial encounter  Referring Physician: Marcus Hsu MD   Current Admission Summary: Marcus Hsu MD H&P :  \"DIAGNOSIS:  Left patella tendon rupture     CHIEF COMPLAINT:  Left knee pain and unable to ambulate     History Obtained From:  patient, electronic medical record     HISTORY OF PRESENT ILLNESS:       The patient is a 39 y.o. female with fall while walking on stairs .  She missed a step and stumbled causing a popping

## 2025-04-20 NOTE — PLAN OF CARE
Problem: Discharge Planning  Goal: Discharge to home or other facility with appropriate resources  Outcome: Progressing  Flowsheets (Taken 4/20/2025 1316)  Discharge to home or other facility with appropriate resources:   Identify barriers to discharge with patient and caregiver   Identify discharge learning needs (meds, wound care, etc)     Problem: Pain  Goal: Verbalizes/displays adequate comfort level or baseline comfort level  Outcome: Progressing  Flowsheets (Taken 4/20/2025 1316)  Verbalizes/displays adequate comfort level or baseline comfort level:   Encourage patient to monitor pain and request assistance   Administer analgesics based on type and severity of pain and evaluate response   Assess pain using appropriate pain scale   Implement non-pharmacological measures as appropriate and evaluate response     Problem: Safety - Adult  Goal: Free from fall injury  Outcome: Progressing  Flowsheets (Taken 4/20/2025 1316)  Free From Fall Injury: Instruct family/caregiver on patient safety     Problem: ABCDS Injury Assessment  Goal: Absence of physical injury  Outcome: Progressing  Flowsheets (Taken 4/20/2025 1316)  Absence of Physical Injury: Implement safety measures based on patient assessment

## 2025-04-20 NOTE — PLAN OF CARE
Problem: Discharge Planning  Goal: Discharge to home or other facility with appropriate resources  4/19/2025 2006 by Afshan Calzada RN  Outcome: Progressing  4/19/2025 1130 by Bernice Laguna RN  Outcome: Progressing  Flowsheets (Taken 4/19/2025 1130)  Discharge to home or other facility with appropriate resources:   Identify barriers to discharge with patient and caregiver   Identify discharge learning needs (meds, wound care, etc)     Problem: Pain  Goal: Verbalizes/displays adequate comfort level or baseline comfort level  4/19/2025 2006 by Afshan Calzada RN  Outcome: Progressing  4/19/2025 1130 by Bernice Laguna RN  Outcome: Progressing  Flowsheets (Taken 4/19/2025 1130)  Verbalizes/displays adequate comfort level or baseline comfort level:   Encourage patient to monitor pain and request assistance   Administer analgesics based on type and severity of pain and evaluate response   Assess pain using appropriate pain scale   Implement non-pharmacological measures as appropriate and evaluate response     Problem: Safety - Adult  Goal: Free from fall injury  4/19/2025 2006 by Afshan Calzada RN  Outcome: Progressing  4/19/2025 1130 by Bernice Laguna RN  Outcome: Progressing  Flowsheets (Taken 4/19/2025 1130)  Free From Fall Injury: Instruct family/caregiver on patient safety     Problem: ABCDS Injury Assessment  Goal: Absence of physical injury  4/19/2025 2006 by Afshan Calzada RN  Outcome: Progressing  4/19/2025 1130 by Bernice Laguna RN  Outcome: Progressing  Flowsheets (Taken 4/19/2025 1130)  Absence of Physical Injury: Implement safety measures based on patient assessment

## 2025-04-21 PROCEDURE — 6370000000 HC RX 637 (ALT 250 FOR IP): Performed by: ORTHOPAEDIC SURGERY

## 2025-04-21 PROCEDURE — 6370000000 HC RX 637 (ALT 250 FOR IP): Performed by: STUDENT IN AN ORGANIZED HEALTH CARE EDUCATION/TRAINING PROGRAM

## 2025-04-21 PROCEDURE — 97116 GAIT TRAINING THERAPY: CPT

## 2025-04-21 PROCEDURE — 2500000003 HC RX 250 WO HCPCS: Performed by: ORTHOPAEDIC SURGERY

## 2025-04-21 PROCEDURE — 1200000000 HC SEMI PRIVATE

## 2025-04-21 PROCEDURE — 97530 THERAPEUTIC ACTIVITIES: CPT

## 2025-04-21 PROCEDURE — 6360000002 HC RX W HCPCS: Performed by: ORTHOPAEDIC SURGERY

## 2025-04-21 PROCEDURE — 99024 POSTOP FOLLOW-UP VISIT: CPT | Performed by: PHYSICIAN ASSISTANT

## 2025-04-21 RX ADMIN — Medication 3 MG: at 21:54

## 2025-04-21 RX ADMIN — ACETAMINOPHEN 650 MG: 325 TABLET ORAL at 17:01

## 2025-04-21 RX ADMIN — HYDROMORPHONE HYDROCHLORIDE 0.5 MG: 1 INJECTION, SOLUTION INTRAMUSCULAR; INTRAVENOUS; SUBCUTANEOUS at 21:54

## 2025-04-21 RX ADMIN — POLYETHYLENE GLYCOL 3350 17 G: 17 POWDER, FOR SOLUTION ORAL at 09:31

## 2025-04-21 RX ADMIN — ENOXAPARIN SODIUM 40 MG: 100 INJECTION SUBCUTANEOUS at 09:31

## 2025-04-21 RX ADMIN — SODIUM CHLORIDE, PRESERVATIVE FREE 10 ML: 5 INJECTION INTRAVENOUS at 09:36

## 2025-04-21 RX ADMIN — ACETAMINOPHEN 650 MG: 325 TABLET ORAL at 21:55

## 2025-04-21 RX ADMIN — HYDROMORPHONE HYDROCHLORIDE 0.5 MG: 1 INJECTION, SOLUTION INTRAMUSCULAR; INTRAVENOUS; SUBCUTANEOUS at 14:52

## 2025-04-21 RX ADMIN — DOCUSATE SODIUM 100 MG: 100 CAPSULE, LIQUID FILLED ORAL at 09:31

## 2025-04-21 RX ADMIN — ACETAMINOPHEN 650 MG: 325 TABLET ORAL at 10:53

## 2025-04-21 RX ADMIN — ACETAMINOPHEN 650 MG: 325 TABLET ORAL at 04:17

## 2025-04-21 RX ADMIN — OXYCODONE HYDROCHLORIDE 10 MG: 5 TABLET ORAL at 09:31

## 2025-04-21 RX ADMIN — SODIUM CHLORIDE, PRESERVATIVE FREE 10 ML: 5 INJECTION INTRAVENOUS at 21:55

## 2025-04-21 ASSESSMENT — PAIN DESCRIPTION - DESCRIPTORS
DESCRIPTORS: ACHING;DISCOMFORT
DESCRIPTORS: ACHING
DESCRIPTORS: ACHING;DISCOMFORT
DESCRIPTORS: ACHING;DISCOMFORT

## 2025-04-21 ASSESSMENT — PAIN - FUNCTIONAL ASSESSMENT
PAIN_FUNCTIONAL_ASSESSMENT: ACTIVITIES ARE NOT PREVENTED

## 2025-04-21 ASSESSMENT — PAIN DESCRIPTION - FREQUENCY
FREQUENCY: CONTINUOUS
FREQUENCY: CONTINUOUS

## 2025-04-21 ASSESSMENT — PAIN DESCRIPTION - ONSET
ONSET: ON-GOING
ONSET: ON-GOING

## 2025-04-21 ASSESSMENT — PAIN DESCRIPTION - LOCATION
LOCATION: LEG;KNEE
LOCATION: LEG;KNEE
LOCATION: LEG
LOCATION: LEG

## 2025-04-21 ASSESSMENT — PAIN DESCRIPTION - ORIENTATION
ORIENTATION: LEFT

## 2025-04-21 ASSESSMENT — PAIN SCALES - GENERAL
PAINLEVEL_OUTOF10: 0
PAINLEVEL_OUTOF10: 0
PAINLEVEL_OUTOF10: 8
PAINLEVEL_OUTOF10: 0
PAINLEVEL_OUTOF10: 8
PAINLEVEL_OUTOF10: 0
PAINLEVEL_OUTOF10: 7
PAINLEVEL_OUTOF10: 8
PAINLEVEL_OUTOF10: 8

## 2025-04-21 ASSESSMENT — PAIN SCALES - WONG BAKER
WONGBAKER_NUMERICALRESPONSE: HURTS LITTLE MORE
WONGBAKER_NUMERICALRESPONSE: HURTS LITTLE MORE

## 2025-04-21 ASSESSMENT — PAIN DESCRIPTION - PAIN TYPE
TYPE: ACUTE PAIN;SURGICAL PAIN
TYPE: ACUTE PAIN;SURGICAL PAIN

## 2025-04-21 NOTE — CARE COORDINATION
Discharge Planning Note:     Called and left message with Inga [place] [facility] to [place] on [place] Pre-cert status. Awaiting return call.

## 2025-04-21 NOTE — PROGRESS NOTES
[facility] - Rehabilitation Department      Physical Therapy  3221/3221-01  [facility] 3 PROGRESSIVE CARE    [] Initial Evaluation            [x] Daily Treatment Note         [] Discharge Summary      Patient: Argentina Barrios   : 1985   MRN: [id number]   Date of Service:  2025   Discharge Recommendations: Home with 24  hr Supv/assist vs SNF if unable to progress to [facility]. Pt requesting SNF   [facility] Raw Score:   AM-PAC Inpatient Mobility Raw Score : 17            DME Required For Discharge:   rolling walker  Therapy discharge recommendations take into account each patient's current medical complexities and are subject to input/oversight from the patient's healthcare team.   Barriers to Home Discharge:   [x] Steps to access home entry or bed/bath:   [x] Unable to transfer, ambulate, or propel wheelchair household distances without assist   [] Limited available assist at home upon discharge    [] Patient or family requests d/c to post-acute facility    [] Poor cognition/safety awareness for d/c to home alone    []Unable to maintain ordered weight bearing status    [] Patient with salient signs of long-standing immobility   [x] Patient is at risk for falls due to: unsteady gait with increased assist to Max with walking around bed with patient with increasing unsteady gait and increased pain and weakness per patient.    [] Other:  If pt is unable to be seen after this session, please let this note serve as discharge summary.  Please see case management note for discharge disposition.  Thank you.  Admitting Diagnosis: Patellar tendon rupture, left, initial encounter  Ordering Physician: Marcus Hsu MD  Current Admission Summary:   25 H&P Marcus Hsu MD,  \"DIAGNOSIS:  Left patella tendon rupture     CHIEF COMPLAINT:  Left knee pain and unable to ambulateHISTORY OF PRESENT ILLNESS:       The patient is a 39 y.o. female with fall while walking on stairs .  She missed a step  Patient requires assist for static/dynamic sitting  with assist  up to SBA   [] Patient able to complete in room static and dynamic sitting balance activity independently without assistive device.   Comments: SBA-SUPV for balance precautions in sitting     STANDING BALANCE  [] Patient requires use of assistive device  for static/dynamic standing   [x] Patient requires assist for static/dynamic standing with assist  up to CGA with AD   [] Patient able to complete in room static and dynamic standing balance activity independently without assistive device.   Comments:     Other Therapeutic Interventions  KI donned and locked in ext   []10-15reps of each exercise:    []Hip abduction/Adduction  BLE small arc LLE   []RLE only heel slides   []Heel and toe raises (ankle DF/PF)   []Gluteal isometrics  []Quadricep Isometrics  []RLE onlySLR    Disease Specific Education for:    [x]WBAT RLE with knee immobilizer blocked in ext.   [x]Brace locked in extension when walking and may unlock brace and bend knee to 30 degrees at tolerated when seated.educated in how to check extension lock prior to walking with patient verbalizing understanding and demo understanding.   [x] review of precautions for WB and brace wear... Emphasized no AROM at Left knee and brace on and locked with all mobility and that clips need to be on when sleeping to prevent ROM in left knee. Advised patient not to unclip KI with patient verbalizing understanding.       Patient instructed in and performed the following exercises with:  []Verbal cues for exercise angles and set up  []Cues for balance  []Facilitation for technique  []Patient verbalized understanding of or demonstrated understanding of instruction. used crutch as leg     Functional Outcomes  AM-PAC Inpatient Mobility Raw Score : 17              Education   Barriers To Learning: none  Patient Education: patient educated on plan of care, discharge recommendations, goals, PT role and benefits,

## 2025-04-21 NOTE — PLAN OF CARE
Problem: Discharge Planning  Goal: Discharge to home or other facility with appropriate resources  4/20/2025 2111 by Afshan Calzada RN  Outcome: Progressing  4/20/2025 1316 by Bernice Laguna RN  Outcome: Progressing  Flowsheets (Taken 4/20/2025 1316)  Discharge to home or other facility with appropriate resources:   Identify barriers to discharge with patient and caregiver   Identify discharge learning needs (meds, wound care, etc)     Problem: Pain  Goal: Verbalizes/displays adequate comfort level or baseline comfort level  4/20/2025 2111 by Afshan Calzada RN  Outcome: Progressing  4/20/2025 1316 by Bernice Laguna RN  Outcome: Progressing  Flowsheets (Taken 4/20/2025 1316)  Verbalizes/displays adequate comfort level or baseline comfort level:   Encourage patient to monitor pain and request assistance   Administer analgesics based on type and severity of pain and evaluate response   Assess pain using appropriate pain scale   Implement non-pharmacological measures as appropriate and evaluate response     Problem: Safety - Adult  Goal: Free from fall injury  4/20/2025 2111 by Afshan Calzada RN  Outcome: Progressing  4/20/2025 1316 by Bernice Laguna RN  Outcome: Progressing  Flowsheets (Taken 4/20/2025 1316)  Free From Fall Injury: Instruct family/caregiver on patient safety     Problem: ABCDS Injury Assessment  Goal: Absence of physical injury  4/20/2025 2111 by Afshan Calzada RN  Outcome: Progressing  4/20/2025 1316 by Bernice Laguna RN  Outcome: Progressing  Flowsheets (Taken 4/20/2025 1316)  Absence of Physical Injury: Implement safety measures based on patient assessment

## 2025-04-21 NOTE — PROGRESS NOTES
Orthopaedics           Progress Note    Patient:  Argentina Barrios  YOB: 1985     39 y.o. female    Subjective:  Patient seen and examined  Pain improving, tolerating brace.  Resting comfortably in bed today.  Awaiting SNF.    Objective:   Vitals:    04/21/25 1248   BP: 131/79   Pulse: 52   Resp: 17   Temp: 98.6 °F (37 °C)   SpO2: 100%     Gen: NAD, cooperative     Left lower extremity: Brace on locked in extension, wound clean and dry. Dressing in place. Compartments soft. EHL/FHL/TA/GS complex motor intact. Sural, saphenous, superificial/deep peroneal, and plantar nerve distribution sensation grossly intact. Dorsalis pedis/posterior tibial pulses 2+ with BCR.      Meds: Lovenox  See rec for complete list    Impression/plan: 39 y.o. female s/p L patellar tendon repair with Dr. Hsu, POD#5 (DOS 4/15/25)    1:  Weight bearing as tolerated with her knee brace locked in extension.  She can unlock the knee brace and passively bend the knee up to 30 degrees.  No active knee extension exercises and no straight leg raises x 6 weeks.   2:  Deep venous thrombosis prophylaxis -Lovenox while in the hospital.  She will transition to aspirin 81 mg twice daily at discharge  3:  Continue physical therapy and Occupational Therapy  4:  D/C Plan:  Skilled Nursing Facility, awaiting precert  5:  Pain Control: Continue current pain management.  6: Follow-up with Dr. Hsu 2 weeks after surgery and we will increase her flexion to 60 degrees passively at that point with the goal of achieving 90 degrees of passive flexion by 6 weeks and she can then begin active knee extension and active quad exercises at that point.        KANDY Guajardo    1:00 PM 4/21/2025

## 2025-04-21 NOTE — PROGRESS NOTES
04/21/25 1418   Encounter Summary   Encounter Overview/Reason Attempted Encounter   Service Provided For Patient not available   Referral/Consult From Rounding   Last Encounter  04/21/25  (Patient was sleeping/CK)

## 2025-04-22 PROCEDURE — 97530 THERAPEUTIC ACTIVITIES: CPT

## 2025-04-22 PROCEDURE — 6370000000 HC RX 637 (ALT 250 FOR IP): Performed by: ORTHOPAEDIC SURGERY

## 2025-04-22 PROCEDURE — 94760 N-INVAS EAR/PLS OXIMETRY 1: CPT

## 2025-04-22 PROCEDURE — 99024 POSTOP FOLLOW-UP VISIT: CPT | Performed by: PHYSICIAN ASSISTANT

## 2025-04-22 PROCEDURE — 97116 GAIT TRAINING THERAPY: CPT

## 2025-04-22 PROCEDURE — 97110 THERAPEUTIC EXERCISES: CPT

## 2025-04-22 PROCEDURE — 6370000000 HC RX 637 (ALT 250 FOR IP): Performed by: STUDENT IN AN ORGANIZED HEALTH CARE EDUCATION/TRAINING PROGRAM

## 2025-04-22 PROCEDURE — 2500000003 HC RX 250 WO HCPCS: Performed by: ORTHOPAEDIC SURGERY

## 2025-04-22 PROCEDURE — 1200000000 HC SEMI PRIVATE

## 2025-04-22 PROCEDURE — 6360000002 HC RX W HCPCS: Performed by: ORTHOPAEDIC SURGERY

## 2025-04-22 RX ADMIN — ACETAMINOPHEN 650 MG: 325 TABLET ORAL at 16:34

## 2025-04-22 RX ADMIN — SODIUM CHLORIDE, PRESERVATIVE FREE 10 ML: 5 INJECTION INTRAVENOUS at 09:21

## 2025-04-22 RX ADMIN — Medication 3 MG: at 20:30

## 2025-04-22 RX ADMIN — ACETAMINOPHEN 650 MG: 325 TABLET ORAL at 04:25

## 2025-04-22 RX ADMIN — SODIUM CHLORIDE, PRESERVATIVE FREE 10 ML: 5 INJECTION INTRAVENOUS at 20:32

## 2025-04-22 RX ADMIN — ENOXAPARIN SODIUM 40 MG: 100 INJECTION SUBCUTANEOUS at 09:12

## 2025-04-22 RX ADMIN — HYDROMORPHONE HYDROCHLORIDE 0.5 MG: 1 INJECTION, SOLUTION INTRAMUSCULAR; INTRAVENOUS; SUBCUTANEOUS at 04:26

## 2025-04-22 RX ADMIN — ACETAMINOPHEN 650 MG: 325 TABLET ORAL at 22:17

## 2025-04-22 RX ADMIN — ACETAMINOPHEN 650 MG: 325 TABLET ORAL at 09:12

## 2025-04-22 RX ADMIN — DOCUSATE SODIUM 100 MG: 100 CAPSULE, LIQUID FILLED ORAL at 09:12

## 2025-04-22 RX ADMIN — HYDROMORPHONE HYDROCHLORIDE 0.5 MG: 1 INJECTION, SOLUTION INTRAMUSCULAR; INTRAVENOUS; SUBCUTANEOUS at 20:31

## 2025-04-22 RX ADMIN — POLYETHYLENE GLYCOL 3350 17 G: 17 POWDER, FOR SOLUTION ORAL at 09:19

## 2025-04-22 ASSESSMENT — PAIN - FUNCTIONAL ASSESSMENT
PAIN_FUNCTIONAL_ASSESSMENT: PREVENTS OR INTERFERES SOME ACTIVE ACTIVITIES AND ADLS
PAIN_FUNCTIONAL_ASSESSMENT: ACTIVITIES ARE NOT PREVENTED
PAIN_FUNCTIONAL_ASSESSMENT: ACTIVITIES ARE NOT PREVENTED
PAIN_FUNCTIONAL_ASSESSMENT: PREVENTS OR INTERFERES SOME ACTIVE ACTIVITIES AND ADLS
PAIN_FUNCTIONAL_ASSESSMENT: ACTIVITIES ARE NOT PREVENTED

## 2025-04-22 ASSESSMENT — PAIN DESCRIPTION - ORIENTATION
ORIENTATION: LEFT
ORIENTATION: LEFT
ORIENTATION: RIGHT;LEFT
ORIENTATION: LEFT

## 2025-04-22 ASSESSMENT — PAIN SCALES - GENERAL
PAINLEVEL_OUTOF10: 0
PAINLEVEL_OUTOF10: 8
PAINLEVEL_OUTOF10: 3
PAINLEVEL_OUTOF10: 7
PAINLEVEL_OUTOF10: 9
PAINLEVEL_OUTOF10: 3
PAINLEVEL_OUTOF10: 4
PAINLEVEL_OUTOF10: 7
PAINLEVEL_OUTOF10: 0
PAINLEVEL_OUTOF10: 0

## 2025-04-22 ASSESSMENT — PAIN DESCRIPTION - PAIN TYPE
TYPE: ACUTE PAIN;SURGICAL PAIN

## 2025-04-22 ASSESSMENT — PAIN DESCRIPTION - LOCATION
LOCATION: LEG
LOCATION: LEG;KNEE
LOCATION: LEG;KNEE
LOCATION: ABDOMEN
LOCATION: LEG;KNEE

## 2025-04-22 ASSESSMENT — PAIN DESCRIPTION - DESCRIPTORS
DESCRIPTORS: DISCOMFORT;ACHING
DESCRIPTORS: ACHING;DISCOMFORT
DESCRIPTORS: ACHING
DESCRIPTORS: ACHING
DESCRIPTORS: ACHING;DISCOMFORT
DESCRIPTORS: ACHING;DISCOMFORT
DESCRIPTORS: ACHING
DESCRIPTORS: ACHING;DISCOMFORT
DESCRIPTORS: ACHING

## 2025-04-22 ASSESSMENT — PAIN DESCRIPTION - FREQUENCY
FREQUENCY: CONTINUOUS

## 2025-04-22 ASSESSMENT — PAIN DESCRIPTION - ONSET
ONSET: GRADUAL
ONSET: ON-GOING

## 2025-04-22 ASSESSMENT — PAIN SCALES - WONG BAKER: WONGBAKER_NUMERICALRESPONSE: HURTS LITTLE MORE

## 2025-04-22 NOTE — PROGRESS NOTES
Orthopaedics           Progress Note    Patient:  Argentina Barrios  YOB: 1985     39 y.o. female    Subjective:  Patient seen and examined  Pain improving, tolerating brace.  Resting comfortably in bed today.  Awaiting SNF/ARU    Objective:   Vitals:    04/22/25 0829   BP:    Pulse:    Resp:    Temp:    SpO2: 100%     Gen: NAD, cooperative     Left lower extremity: Brace on locked in extension, wound clean and dry. Dressing in place. Compartments soft. EHL/FHL/TA/GS complex motor intact. Sural, saphenous, superificial/deep peroneal, and plantar nerve distribution sensation grossly intact. Dorsalis pedis/posterior tibial pulses 2+ with BCR.      Meds: Lovenox  See rec for complete list    Impression/plan: 39 y.o. female s/p L patellar tendon repair with Dr. Hsu,  (DOS 4/15/25)    1:  Weight bearing as tolerated with her knee brace locked in extension.  She can unlock the knee brace and passively bend the knee up to 30 degrees.  No active knee extension exercises and no straight leg raises x 6 weeks.   2:  Deep venous thrombosis prophylaxis -Lovenox while in the hospital.  She will transition to aspirin 81 mg twice daily at discharge  3:  Continue physical therapy and Occupational Therapy  4:  D/C Plan:  Skilled Nursing Facility, awaiting precert  5:  Pain Control: Continue current pain management.  6: Follow-up with Dr. Hsu 2 weeks after surgery and we will increase her flexion to 60 degrees passively at that point with the goal of achieving 90 degrees of passive flexion by 6 weeks and she can then begin active knee extension and active quad exercises at that point.        KANDY Guajardo    1:51 PM 4/22/2025

## 2025-04-22 NOTE — PROGRESS NOTES
04/22/25 1439   Encounter Summary   Encounter Overview/Reason Attempted Encounter   Service Provided For Patient not available   Referral/Consult From Rounding   Last Encounter  04/22/25  (Patient was with staff/CK)

## 2025-04-22 NOTE — PLAN OF CARE
Problem: Discharge Planning  Goal: Discharge to home or other facility with appropriate resources  4/22/2025 1028 by Bernice Coreas RN  Outcome: Progressing  4/22/2025 0615 by Virgilio Spaulding, RN  Outcome: Progressing     Problem: Pain  Goal: Verbalizes/displays adequate comfort level or baseline comfort level  4/22/2025 1028 by Bernice Coreas RN  Outcome: Progressing  4/22/2025 0615 by Virgilio Spaulding, RN  Outcome: Progressing     Problem: Safety - Adult  Goal: Free from fall injury  4/22/2025 1028 by Bernice Coreas RN  Outcome: Progressing  4/22/2025 0615 by Virgilio Spaulding, RN  Outcome: Progressing     Problem: ABCDS Injury Assessment  Goal: Absence of physical injury  4/22/2025 1028 by Bernice Coreas RN  Outcome: Progressing  4/22/2025 0615 by Virgilio Spaulding, RN  Outcome: Progressing

## 2025-04-22 NOTE — PROGRESS NOTES
Occupational Therapy Treatment Attempt/Refusal:    Patient met sitting up in chair. Patient with visitor in room, and pateint asking therapist to come back at a later time.    Will re-attempt again later on this date, and as patient consents to participate.     Jojo Smith OTR

## 2025-04-22 NOTE — PROGRESS NOTES
[facility] - Rehabilitation Department      Physical Therapy  3221/3221-01  [facility] 3 PROGRESSIVE CARE    [] Initial Evaluation            [x] Daily Treatment Note         [] Discharge Summary      Patient: Argentina Barrios   : 1985   MRN: [id number]   Date of Service:  2025   Discharge Recommendations: Home with 24  hr Supv/assist vs SNF if unable to progress to [facility]. Pt requesting SNF   [facility] Raw Score:   AM-PAC Inpatient Mobility Raw Score : 15            DME Required For Discharge:   rolling walker  Therapy discharge recommendations take into account each patient's current medical complexities and are subject to input/oversight from the patient's healthcare team.   Barriers to Home Discharge:   [x] Steps to access home entry or bed/bath:   [x] Unable to transfer, ambulate, or propel wheelchair household distances without assist   [] Limited available assist at home upon discharge    [] Patient or family requests d/c to post-acute facility    [] Poor cognition/safety awareness for d/c to home alone    []Unable to maintain ordered weight bearing status    [] Patient with salient signs of long-standing immobility   [x] Patient is at risk for falls due to: unsteady gait with increased assist to Max with walking around bed with patient with increasing unsteady gait and increased pain and weakness per patient.    [] Other:  If pt is unable to be seen after this session, please let this note serve as discharge summary.  Please see case management note for discharge disposition.  Thank you.  Admitting Diagnosis: Patellar tendon rupture, left, initial encounter  Ordering Physician: Marcus Hsu MD  Current Admission Summary:   25 H&P Marcus Hsu MD,  \"DIAGNOSIS:  Left patella tendon rupture     CHIEF COMPLAINT:  Left knee pain and unable to ambulateHISTORY OF PRESENT ILLNESS:       The patient is a 39 y.o. female with fall while walking on stairs .  She missed a step

## 2025-04-22 NOTE — PROGRESS NOTES
Occupational Therapy    [facility] - Rehabilitation Department       Occupational Therapy  3221/3221-01  [facility] 3 PROGRESSIVE CARE    [] Initial Evaluation            [x] Daily Treatment Note         [] Discharge Summary      Patient: Argentina Barrios   : 1985   MRN: [id number]   Date of Service:  2025  Discharge Recommendations: Home with 24 hr assist vs SNF     AM-PAC Inpatient Daily Activity Raw Score: 20    DME Required For Discharge: rolling walker, tub transfer bench, grab bars - toilet, reacher, sock-aid, long-handle sponge Discussed with patient and spouse recommended DME, spouse reporting they will start to obtain DME at this time and verbalized understanding    Therapy discharge recommendations take into account each patient's current medical complexities and are subject to input/oversight from the patient's healthcare team.     Barriers to Home Discharge:   [x] Steps to access home entry or bed/bath:   [x] Unable to transfer, ambulate, or propel wheelchair household distances without assist   [x] Limited available assist at home upon discharge    [x] Patient or family requests d/c to post-acute facility    [] Poor cognition/safety awareness for d/c to home alone    []Unable to maintain ordered weight bearing status    [] Patient with salient signs of long-standing immobility   [x] Patient is at risk for falls due to: impaired balance   [] Other:    If pt is unable to be seen after this session, please let this note serve as discharge summary.  Please see case management note for discharge disposition.  Thank you.  Admitting Diagnosis:  Patellar tendon rupture, left, initial encounter  Referring Physician: Marcus Hsu MD   Current Admission Summary: Marcus Hsu MD H&P :  \"DIAGNOSIS:  Left patella tendon rupture     CHIEF COMPLAINT:  Left knee pain and unable to ambulate     History Obtained From:  patient, electronic medical record     HISTORY OF PRESENT  family support, inability to negotiate stairs to enter home/bedroom/bathroom, burden of care beyond caregiver ability, home environment not conducive to patient recovery, and limited safety awareness.     Patient seen today for   [x] Therex completed & addressed: Functional dynamic standing balance & endurance & BUE exercises  [x] Theract completed & addressed: functional tf, functional mob; continued education on leg lift use, and ordered precautions  [] ADL Training completed & addressed: declined ADLs  [] NM re-education completed & addressed:   Patient completed functional transfers SBA w/ RW. Patient at times impulsive standing up w/o gait belt, RW, or RW off to the side, needing education on safety awareness and proper use of RW. Patient completed functional mobility SBA-CGA w/ RW. Patient completed functional dynamic reaching task needing SBA w/ RW throughout. Patient placing items in high cabinet shelf SBA w/ RW. Patient completed various BUE exercises seated EOB. Patient given Red theraband HEP at end of session and verbalized understanding of exercises and proper form. Patient with continued high pain on this date, needing rest to recover throughout.  ___________________________________________________________________________________________________________________________________________________________  Precautions/Restrictions:   high fall risk, weight bearing, and surgical protocols  Activity Day of Surgery:   1)  Up to bedside and advance activity with assistance as tolerated.  Brace worn when out of bed and locked at 0 extension for ambulation.  May unlock brace and bend knee up to 30 degrees as tolerated when seated.      Activity POD#1  1)  Ambulate in room progressing to hallway     Weight Bearing Restrictions: weight bearing as tolerated  [] Right Upper Extremity  [] Left Upper Extremity [] Right Lower Extremity  [x] Left Lower Extremity     Required Braces/Orthotics: knee immobilizer   [] Right

## 2025-04-23 ENCOUNTER — TELEPHONE (OUTPATIENT)
Dept: ORTHOPEDIC SURGERY | Age: 40
End: 2025-04-23

## 2025-04-23 PROCEDURE — 99024 POSTOP FOLLOW-UP VISIT: CPT | Performed by: PHYSICIAN ASSISTANT

## 2025-04-23 PROCEDURE — 2500000003 HC RX 250 WO HCPCS: Performed by: ORTHOPAEDIC SURGERY

## 2025-04-23 PROCEDURE — 97535 SELF CARE MNGMENT TRAINING: CPT

## 2025-04-23 PROCEDURE — 97116 GAIT TRAINING THERAPY: CPT

## 2025-04-23 PROCEDURE — 97530 THERAPEUTIC ACTIVITIES: CPT

## 2025-04-23 PROCEDURE — 6370000000 HC RX 637 (ALT 250 FOR IP): Performed by: ORTHOPAEDIC SURGERY

## 2025-04-23 PROCEDURE — 1200000000 HC SEMI PRIVATE

## 2025-04-23 PROCEDURE — 6370000000 HC RX 637 (ALT 250 FOR IP): Performed by: STUDENT IN AN ORGANIZED HEALTH CARE EDUCATION/TRAINING PROGRAM

## 2025-04-23 PROCEDURE — 6360000002 HC RX W HCPCS: Performed by: ORTHOPAEDIC SURGERY

## 2025-04-23 RX ADMIN — ENOXAPARIN SODIUM 40 MG: 100 INJECTION SUBCUTANEOUS at 08:22

## 2025-04-23 RX ADMIN — DOCUSATE SODIUM 100 MG: 100 CAPSULE, LIQUID FILLED ORAL at 08:23

## 2025-04-23 RX ADMIN — SODIUM CHLORIDE, PRESERVATIVE FREE 10 ML: 5 INJECTION INTRAVENOUS at 21:06

## 2025-04-23 RX ADMIN — ACETAMINOPHEN 650 MG: 325 TABLET ORAL at 16:14

## 2025-04-23 RX ADMIN — OXYCODONE HYDROCHLORIDE 10 MG: 5 TABLET ORAL at 08:25

## 2025-04-23 RX ADMIN — Medication 3 MG: at 22:38

## 2025-04-23 RX ADMIN — OXYCODONE HYDROCHLORIDE 10 MG: 5 TABLET ORAL at 14:59

## 2025-04-23 RX ADMIN — HYDROMORPHONE HYDROCHLORIDE 0.5 MG: 1 INJECTION, SOLUTION INTRAMUSCULAR; INTRAVENOUS; SUBCUTANEOUS at 17:33

## 2025-04-23 RX ADMIN — HYDROMORPHONE HYDROCHLORIDE 0.5 MG: 1 INJECTION, SOLUTION INTRAMUSCULAR; INTRAVENOUS; SUBCUTANEOUS at 05:39

## 2025-04-23 RX ADMIN — ACETAMINOPHEN 650 MG: 325 TABLET ORAL at 08:22

## 2025-04-23 RX ADMIN — OXYCODONE HYDROCHLORIDE 10 MG: 5 TABLET ORAL at 03:44

## 2025-04-23 RX ADMIN — OXYCODONE HYDROCHLORIDE 10 MG: 5 TABLET ORAL at 21:01

## 2025-04-23 RX ADMIN — ACETAMINOPHEN 650 MG: 325 TABLET ORAL at 21:01

## 2025-04-23 RX ADMIN — SODIUM CHLORIDE, PRESERVATIVE FREE 10 ML: 5 INJECTION INTRAVENOUS at 08:25

## 2025-04-23 RX ADMIN — HYDROMORPHONE HYDROCHLORIDE 0.5 MG: 1 INJECTION, SOLUTION INTRAMUSCULAR; INTRAVENOUS; SUBCUTANEOUS at 22:38

## 2025-04-23 ASSESSMENT — PAIN DESCRIPTION - LOCATION
LOCATION: LEG
LOCATION: LEG
LOCATION: KNEE
LOCATION: KNEE
LOCATION: KNEE;LEG
LOCATION: LEG
LOCATION: KNEE

## 2025-04-23 ASSESSMENT — PAIN DESCRIPTION - ORIENTATION
ORIENTATION: LEFT

## 2025-04-23 ASSESSMENT — PAIN DESCRIPTION - ONSET
ONSET: ON-GOING
ONSET: GRADUAL
ONSET: ON-GOING
ONSET: ON-GOING
ONSET: GRADUAL

## 2025-04-23 ASSESSMENT — PAIN DESCRIPTION - DESCRIPTORS
DESCRIPTORS: ACHING;SORE
DESCRIPTORS: ACHING
DESCRIPTORS: ACHING;DISCOMFORT
DESCRIPTORS: ACHING;THROBBING
DESCRIPTORS: ACHING;THROBBING
DESCRIPTORS: ACHING
DESCRIPTORS: ACHING;SORE
DESCRIPTORS: STABBING
DESCRIPTORS: ACHING

## 2025-04-23 ASSESSMENT — PAIN DESCRIPTION - FREQUENCY
FREQUENCY: CONTINUOUS

## 2025-04-23 ASSESSMENT — PAIN SCALES - GENERAL
PAINLEVEL_OUTOF10: 0
PAINLEVEL_OUTOF10: 6
PAINLEVEL_OUTOF10: 8
PAINLEVEL_OUTOF10: 6
PAINLEVEL_OUTOF10: 0
PAINLEVEL_OUTOF10: 8
PAINLEVEL_OUTOF10: 7
PAINLEVEL_OUTOF10: 7
PAINLEVEL_OUTOF10: 5
PAINLEVEL_OUTOF10: 0
PAINLEVEL_OUTOF10: 8
PAINLEVEL_OUTOF10: 8
PAINLEVEL_OUTOF10: 0
PAINLEVEL_OUTOF10: 7
PAINLEVEL_OUTOF10: 0

## 2025-04-23 ASSESSMENT — PAIN - FUNCTIONAL ASSESSMENT
PAIN_FUNCTIONAL_ASSESSMENT: ACTIVITIES ARE NOT PREVENTED

## 2025-04-23 ASSESSMENT — PAIN DESCRIPTION - PAIN TYPE
TYPE: SURGICAL PAIN
TYPE: ACUTE PAIN;SURGICAL PAIN
TYPE: SURGICAL PAIN
TYPE: ACUTE PAIN;SURGICAL PAIN
TYPE: ACUTE PAIN

## 2025-04-23 NOTE — PROGRESS NOTES
functioning well below baseline, demonstrates good rehab potential and unable to return home due to inability to negotiate stairs to enter home/bedroom/bathroom and usnteady gait with recent fall per patient. .   Clinical Assessment:   Patient seen today for skilled physical therapy secondary to patellar tendon tear and surgical intervention. Pt completed the following:   [] Therex completed & addressed:   [x] Theract completed & addressed: transfers  [x] Gait completed & addressed: progressive gait distances with RW with pain and weakenss per patient limiting walking distances; step-to gait. 4 inch step training in front of bed.   [] NM re-education completed & addressed:   Patient's brace adjusted at start of session with KI positioning and straps adjusted to support knee extension. Braces locked at 0 degrees throughout session. Patient seen for co-tx with OT educating on transfer training with use of pillow and leg  during session while PT addressing gait training and cues for mobility and brace positioning.,  Patient reports pain with lowering leg to floor when using crutches, requests trial of pillow for lift and lower and reports no pain from sit to sup but pain in knee from sup to sit eob, pt after educated in leg  able to demo without report of increased pain.  Patient walking in [place] 70 feet x2 pt cued for step to gait leading with LLE with  patient needing cues for not walking past walker cross bar and step to with LLE leading as patient takes a few steps leading with RLE.   Patient screamed when therapist lifting leg and then lowering to leg support on WC stating it is bending her knee and stated, \"don't patronize me.I know when my leg is bending.\" This therapist supporting LLE in knee extension with KI donned and locked and supported with lower leg. Pt after seated rest requested to walk back to room. Pt SBA to CGA and for sit <>stand required SBA to total assist to manage LLE.   Patient  given cold pack for LE and supported LLE on pillow KI donnned at end of the session.Notified patient's Rn of patient request for pain meds and acknowledges patients pain with mobility and offered cold pack and notification of Rn for pain meds.   Activity Tolerance:See below.   Impairments Requiring Therapeutic Intervention: decreased functional mobility, decreased ADL status, decreased ROM, decreased strength, decreased endurance, decreased balance, increased pain  Rehab Potential: good  ___________________________________________________________________________________________________________________________________________  Precautions/Restrictions:   high fall risk, weight bearing, ROM  restrictions, and surgical protocols    Activity Day of Surgery:   1)  Up to bedside and advance activity with assistance as tolerated.  Brace worn when out of bed and locked at 0 extension for ambulation.  May unlock brace and bend knee up to 30 degrees as tolerated when seated.     Activity POD#1  1)  Ambulate in room progressing to hallway with assistive device four times daily (including PT) when PT advises.   2)  Bathroom privileges with assistance.  3) Up in bedside chair at least TID as tolerated.   4/22/25 Ortho PA note Ren Curry:  \"  1:  Weight bearing as tolerated with her knee brace locked in extension.  She can unlock the knee brace and passively bend the knee up to 30 degrees.  No active knee extension exercises and no straight leg raises x 6 weeks. \"  Weight Bearing Restrictions:weight bearing as tolerated  [] Left Upper Extremity  [] Right Upper Extremity   [x] Left Lower Extremity  [] Right Lower Extremity     Required Braces/Orthotics: knee immobilizer,     [x] Left  [] Right  Brace locked in extension when walking and may unlock brace and bend knee to 30 degrees at tolerated when seated.     Positional Restrictions: knee locked in extension with walking/mobility     Pre-Admission Information    Lives With:

## 2025-04-23 NOTE — PLAN OF CARE
Problem: Discharge Planning  Goal: Discharge to home or other facility with appropriate resources  4/22/2025 2038 by Zoila Dumotn RN  Outcome: Progressing  Flowsheets (Taken 4/22/2025 2030)  Discharge to home or other facility with appropriate resources:   Identify barriers to discharge with patient and caregiver   Arrange for needed discharge resources and transportation as appropriate   Identify discharge learning needs (meds, wound care, etc)   Arrange for interpreters to assist at discharge as needed   Refer to discharge planning if patient needs post-hospital services based on physician order or complex needs related to functional status, cognitive ability or social support system  4/22/2025 1028 by Bernice Coreas RN  Outcome: Progressing     Problem: Pain  Goal: Verbalizes/displays adequate comfort level or baseline comfort level  4/22/2025 2038 by Zoila Dumont RN  Outcome: Progressing  4/22/2025 1028 by Bernice Coreas RN  Outcome: Progressing     Problem: Safety - Adult  Goal: Free from fall injury  4/22/2025 2038 by Zoila Dumont RN  Outcome: Progressing  Flowsheets (Taken 4/22/2025 2002)  Free From Fall Injury:   Instruct family/caregiver on patient safety   Based on caregiver fall risk screen, instruct family/caregiver to ask for assistance with transferring infant if caregiver noted to have fall risk factors  4/22/2025 1028 by Bernice Coreas RN  Outcome: Progressing     Problem: ABCDS Injury Assessment  Goal: Absence of physical injury  4/22/2025 2038 by Zoila Dumont RN  Outcome: Progressing  4/22/2025 1028 by Bernice Coreas RN  Outcome: Progressing

## 2025-04-23 NOTE — PROGRESS NOTES
Vitals:    04/23/25 1327   BP: 111/60   Pulse: 62   Resp: 18   Temp: 98.1 °F (36.7 °C)   SpO2: 100%     Patient has knee brace in place, +2 pedal pulses, states pain 7-10 and medicated per MAR. Patient working with therapy today. Patient in bed with pure wick. RN offered emotional support with her situation and no needs at this time. Will monitor.

## 2025-04-23 NOTE — PROGRESS NOTES
Nutrition Assessment     Type and Reason for Visit: LOS    Nutrition Recommendations/Plan:   Diet: Continue Regular diet.  ONS: Change to Ensure HP, change timing.       Malnutrition Assessment:  Malnutrition Status: No malnutrition    Nutrition Assessment:  LOS. Pt with no significant PMH, who presented after fall. Pt s/p L patellar tendon repair on 4/15. Pt doing well from nutrtion standpoint. On Regular diet with intakes ~50% or greater. ONS on board but not scheduled for appropriate time, will adjust. Pt D/C underway, awaiting precert. No further nutrition interventions needed at this time.    Nutrition Related Findings:   BM 4/22; Non pitting LLE; Labs reviewed Wound Type: Surgical Incision    Current Nutrition Therapies:    ADULT DIET; Regular  ADULT ORAL NUTRITION SUPPLEMENT; PM Snack; Standard High Calorie/High Protein Oral Supplement    Anthropometric Measures:  Height: 170.2 cm (5' 7\")  Current Body Wt: 86.2 kg (190 lb 0.6 oz)   BMI: 29.8        Nutrition Diagnosis:   No nutrition diagnosis at this time     Nutrition Interventions:   Food and/or Nutrient Delivery: Continue Current Diet, Modify Oral Nutrition Supplement  Nutrition Education/Counseling: Education/Counseling not indicated  Coordination of Nutrition Care: Continue to monitor while inpatient     Discharge Planning:    No discharge needs at this time     Madelyn Clifton RD, LD  Contact: Zeinab Haynes Dietitian  Madelyn Clifton RD, LD: [phone]

## 2025-04-23 NOTE — PROGRESS NOTES
Occupational Therapy Treatment Attempt/Refusal:    Patient met at bedside with family in room. Patient declining OT treatment at this time, asking for time to spend with family requesting therapist to re-attempt treatment after 3pm    Will re-attempt OT treatment upon patient's consent to participate and as therapy schedule allows.    Jojo Smith, OT

## 2025-04-23 NOTE — CARE COORDINATION
[facility] does not have any other insurance, and precert is pending.    Yoli Mccartney RN

## 2025-04-23 NOTE — PROGRESS NOTES
Orthopaedics           Progress Note    Patient:  Argentina Barrios  YOB: 1985     39 y.o. female    Subjective:  Patient seen and examined  Pain improving, tolerating brace.  Resting comfortably in bed today.  Awaiting SNF/ARU    Objective:   Vitals:    04/23/25 1327   BP: 111/60   Pulse: 62   Resp: 18   Temp: 98.1 °F (36.7 °C)   SpO2: 100%     Gen: NAD, cooperative     Left lower extremity: Brace on locked in extension, wound clean and dry. Dressing in place. Compartments soft. EHL/FHL/TA/GS complex motor intact. Sural, saphenous, superificial/deep peroneal, and plantar nerve distribution sensation grossly intact. Dorsalis pedis/posterior tibial pulses 2+ with BCR.      Meds: Lovenox  See rec for complete list    Impression/plan: 39 y.o. female s/p L patellar tendon repair with Dr. Hsu,  (DOS 4/15/25)    1:  Weight bearing as tolerated with her knee brace locked in extension.  She can unlock the knee brace and passively bend the knee up to 30 degrees.  No active knee extension exercises and no straight leg raises x 6 weeks.   2:  Deep venous thrombosis prophylaxis -Lovenox while in the hospital.  She will transition to aspirin 81 mg twice daily at discharge  3:  Continue physical therapy and Occupational Therapy  4:  D/C Plan:  Skilled Nursing Facility, awaiting precert  5:  Pain Control: Continue current pain management.  6: Follow-up with Dr. Hsu 2 weeks after surgery and we will increase her flexion to 60 degrees passively at that point with the goal of achieving 90 degrees of passive flexion by 6 weeks and she can then begin active knee extension and active quad exercises at that point.        KANDY Guajardo    2:43 PM 4/23/2025

## 2025-04-23 NOTE — PROGRESS NOTES
arrangements for discharge planning after surgery is complete and once we have a physical therapy assessment.  Preoperative routine labs have been ordered.  Should be n.p.o. after midnight.  Will begin DVT prophylaxis on postoperative day #1.  For now we will utilize sequential compression devices for DVT prophylaxis.\"     Marcus Hsu MD Brief Op Note 4/15:  \"Date of Procedure: 4/15/2025     Pre-Op Diagnosis Codes:      * Patellar tendon rupture, left, initial encounter [S86.812A]     Post-Op Diagnosis: Same       Procedure(s):  LEFT PATELLA TENDON REPAIR     Surgeon(s):  Marcus Hsu MD\"    Past Medical History:  has a past medical history of ADHD (attention deficit hyperactivity disorder), Allergic rhinitis, Depression, and Vitamin D deficiency.  Past Surgical History:  has a past surgical history that includes  section (, ); laparotomy (2009); Tonsillectomy (1988); Hysterectomy; US BIOPSY OF SALIVARY GLAND (2024); and Patellar tendon repair (Left, 4/15/2025).  _______________________________________________________________________________________________________________________________________________________  Assessment  Impairments Requiring Therapeutic Intervention: decreased functional mobility, decreased ADL status, decreased ROM, decreased strength, decreased safety awareness, decreased endurance, decreased balance, decreased IADL, increased pain  Prognosis: good    Assessment/Clinical Assessment   Pt seen today for occupational therapy Treatment.   Pt demonstrated decreased Activity tolerance, Functional Mobility , Balance, Safety, and Strength as well as decreased independence with Activities of Daily Living, Functional Mobility, Functional Transfers,, and Instrumental Activities of Daily Living.   Recommending SNF upon discharge as patient functioning well below baseline, demonstrates good rehab potential and unable to return home due to limited or no  device.   Comments: Patient noted to frequently take hands of RW when talking and using hands during conversation, steady throughout      Disease Specific Education for:    [x]WB precautions & ordered precautions  [x]Positioning for protecting bony prominences  [x]Positioning to avoid dependent positioning to avoid exacerbation/onset of dependent edema    Functional Outcomes  AM-PAC Inpatient Daily Activity Raw Score: 20    ___________________________________________________________________________________________________________________________________________________________    Patient Disposition at start of session:  General: Agreeable to OT treatment  [x]cleared by Nursing for therapy treatment  [x]Patient on arrival: [x]in bed, []in chair.     Patient Disposition at end of 1st and 2nd session:  patient left in bed, bed alarm in place, call light within reach, gait belt, patient at risk for falls, nurse notified, Ice applied, & KI locked in extension throughout session and at end of session, and heels floated     Education   Barriers To Learning: none  Patient Education: patient educated on goals, OT role and benefits, plan of care, precautions, ADL adaptive strategies, weight-bearing education, proper use of assistive device/equipment, adaptive device training, disease specific education, pressure relief, transfer training, discharge recommendations  Learning Assessment: patient verbalizes understanding, would benefit from continued reinforcement  ___________________________________________________________________________________________________________________________________________________________    Plan   Frequency: 5-7 x/week  Current Treatment Recommendations: strengthening, balance training, functional mobility training, transfer training, patient/caregiver education, ADL/self-care training, IADL training, home exercise program, safety education, and equipment evaluation/education    Goals   Patient

## 2025-04-23 NOTE — CARE COORDINATION
RW order noted. Patient agreeable to using [employer] for RW. RW delivered to patient from DME closet. Delivery ticket signed, copy given to patient. Original signed ticket was stapled to facesheet and order and placed in DME closet paperwork bin. Delivery ticket also scanned and emailed to Linda.    Yoli Mccartney RN

## 2025-04-24 PROCEDURE — 2500000003 HC RX 250 WO HCPCS: Performed by: ORTHOPAEDIC SURGERY

## 2025-04-24 PROCEDURE — 99024 POSTOP FOLLOW-UP VISIT: CPT | Performed by: PHYSICIAN ASSISTANT

## 2025-04-24 PROCEDURE — 6370000000 HC RX 637 (ALT 250 FOR IP): Performed by: STUDENT IN AN ORGANIZED HEALTH CARE EDUCATION/TRAINING PROGRAM

## 2025-04-24 PROCEDURE — 6370000000 HC RX 637 (ALT 250 FOR IP): Performed by: ORTHOPAEDIC SURGERY

## 2025-04-24 PROCEDURE — 97535 SELF CARE MNGMENT TRAINING: CPT

## 2025-04-24 PROCEDURE — 6360000002 HC RX W HCPCS: Performed by: STUDENT IN AN ORGANIZED HEALTH CARE EDUCATION/TRAINING PROGRAM

## 2025-04-24 PROCEDURE — 97530 THERAPEUTIC ACTIVITIES: CPT

## 2025-04-24 PROCEDURE — 1200000000 HC SEMI PRIVATE

## 2025-04-24 PROCEDURE — 6360000002 HC RX W HCPCS: Performed by: ORTHOPAEDIC SURGERY

## 2025-04-24 PROCEDURE — 97110 THERAPEUTIC EXERCISES: CPT

## 2025-04-24 RX ORDER — DIPHENHYDRAMINE HYDROCHLORIDE 50 MG/ML
25 INJECTION, SOLUTION INTRAMUSCULAR; INTRAVENOUS EVERY 6 HOURS PRN
Status: DISCONTINUED | OUTPATIENT
Start: 2025-04-24 | End: 2025-04-25 | Stop reason: HOSPADM

## 2025-04-24 RX ORDER — OXYCODONE AND ACETAMINOPHEN 5; 325 MG/1; MG/1
1 TABLET ORAL EVERY 4 HOURS PRN
Refills: 0 | Status: DISCONTINUED | OUTPATIENT
Start: 2025-04-24 | End: 2025-04-25 | Stop reason: HOSPADM

## 2025-04-24 RX ORDER — OXYCODONE AND ACETAMINOPHEN 10; 325 MG/1; MG/1
1 TABLET ORAL EVERY 4 HOURS PRN
Refills: 0 | Status: DISCONTINUED | OUTPATIENT
Start: 2025-04-24 | End: 2025-04-25 | Stop reason: HOSPADM

## 2025-04-24 RX ADMIN — DIPHENHYDRAMINE HYDROCHLORIDE 25 MG: 50 INJECTION, SOLUTION INTRAMUSCULAR; INTRAVENOUS at 21:26

## 2025-04-24 RX ADMIN — ENOXAPARIN SODIUM 40 MG: 100 INJECTION SUBCUTANEOUS at 08:38

## 2025-04-24 RX ADMIN — SODIUM CHLORIDE, PRESERVATIVE FREE 10 ML: 5 INJECTION INTRAVENOUS at 21:27

## 2025-04-24 RX ADMIN — ACETAMINOPHEN 650 MG: 325 TABLET ORAL at 08:38

## 2025-04-24 RX ADMIN — OXYCODONE HYDROCHLORIDE 5 MG: 5 TABLET ORAL at 10:16

## 2025-04-24 RX ADMIN — SODIUM CHLORIDE, PRESERVATIVE FREE 10 ML: 5 INJECTION INTRAVENOUS at 08:43

## 2025-04-24 RX ADMIN — HYDROMORPHONE HYDROCHLORIDE 0.5 MG: 1 INJECTION, SOLUTION INTRAMUSCULAR; INTRAVENOUS; SUBCUTANEOUS at 08:38

## 2025-04-24 RX ADMIN — DIPHENHYDRAMINE HYDROCHLORIDE 25 MG: 50 INJECTION, SOLUTION INTRAMUSCULAR; INTRAVENOUS at 00:22

## 2025-04-24 RX ADMIN — HYDROMORPHONE HYDROCHLORIDE 0.5 MG: 1 INJECTION, SOLUTION INTRAMUSCULAR; INTRAVENOUS; SUBCUTANEOUS at 21:26

## 2025-04-24 RX ADMIN — OXYCODONE HYDROCHLORIDE 10 MG: 5 TABLET ORAL at 04:03

## 2025-04-24 RX ADMIN — OXYCODONE HYDROCHLORIDE 10 MG: 5 TABLET ORAL at 18:43

## 2025-04-24 RX ADMIN — OXYCODONE HYDROCHLORIDE 10 MG: 5 TABLET ORAL at 14:19

## 2025-04-24 RX ADMIN — ACETAMINOPHEN 650 MG: 325 TABLET ORAL at 14:18

## 2025-04-24 RX ADMIN — Medication 3 MG: at 21:20

## 2025-04-24 RX ADMIN — DOCUSATE SODIUM 100 MG: 100 CAPSULE, LIQUID FILLED ORAL at 08:38

## 2025-04-24 ASSESSMENT — PAIN DESCRIPTION - ONSET
ONSET: ON-GOING
ONSET: ON-GOING

## 2025-04-24 ASSESSMENT — PAIN SCALES - GENERAL
PAINLEVEL_OUTOF10: 9
PAINLEVEL_OUTOF10: 8
PAINLEVEL_OUTOF10: 6
PAINLEVEL_OUTOF10: 8
PAINLEVEL_OUTOF10: 7
PAINLEVEL_OUTOF10: 0
PAINLEVEL_OUTOF10: 8
PAINLEVEL_OUTOF10: 7
PAINLEVEL_OUTOF10: 7
PAINLEVEL_OUTOF10: 0

## 2025-04-24 ASSESSMENT — PAIN DESCRIPTION - DESCRIPTORS
DESCRIPTORS: ACHING;THROBBING
DESCRIPTORS: ACHING
DESCRIPTORS: ACHING;THROBBING

## 2025-04-24 ASSESSMENT — PAIN DESCRIPTION - ORIENTATION
ORIENTATION: LEFT

## 2025-04-24 ASSESSMENT — PAIN DESCRIPTION - LOCATION
LOCATION: KNEE
LOCATION: LEG

## 2025-04-24 ASSESSMENT — PAIN DESCRIPTION - FREQUENCY
FREQUENCY: CONTINUOUS
FREQUENCY: CONTINUOUS

## 2025-04-24 ASSESSMENT — PAIN - FUNCTIONAL ASSESSMENT
PAIN_FUNCTIONAL_ASSESSMENT: PREVENTS OR INTERFERES WITH ALL ACTIVE AND SOME PASSIVE ACTIVITIES
PAIN_FUNCTIONAL_ASSESSMENT: PREVENTS OR INTERFERES SOME ACTIVE ACTIVITIES AND ADLS
PAIN_FUNCTIONAL_ASSESSMENT: ACTIVITIES ARE NOT PREVENTED
PAIN_FUNCTIONAL_ASSESSMENT: PREVENTS OR INTERFERES SOME ACTIVE ACTIVITIES AND ADLS
PAIN_FUNCTIONAL_ASSESSMENT: PREVENTS OR INTERFERES SOME ACTIVE ACTIVITIES AND ADLS

## 2025-04-24 ASSESSMENT — PAIN DESCRIPTION - PAIN TYPE
TYPE: SURGICAL PAIN
TYPE: SURGICAL PAIN

## 2025-04-24 NOTE — PROGRESS NOTES
Physical Therapy  Late entry for 4/24/25  Unable to progress patient to PROM beyond full extension locked in brace as patient screaming out in pain with lowering of leg to leg rest on WC with patient requesting to walk back to room at end of session(see 4/23/25 note). Will continue to progress mobilization as tolerated by patient.   An Houser, PT

## 2025-04-24 NOTE — PLAN OF CARE
Problem: Discharge Planning  Goal: Discharge to home or other facility with appropriate resources  Outcome: Progressing  Flowsheets (Taken 4/24/2025 0827)  Discharge to home or other facility with appropriate resources: Identify barriers to discharge with patient and caregiver     Problem: Pain  Goal: Verbalizes/displays adequate comfort level or baseline comfort level  Outcome: Progressing     Problem: Safety - Adult  Goal: Free from fall injury  Outcome: Progressing     Problem: ABCDS Injury Assessment  Goal: Absence of physical injury  Outcome: Progressing  Flowsheets (Taken 4/24/2025 0827)  Absence of Physical Injury: Implement safety measures based on patient assessment

## 2025-04-24 NOTE — PROGRESS NOTES
Outcomes  AM-PAC Inpatient Mobility Raw Score : 16              Education   Barriers To Learning: none  Patient Education: patient educated on plan of care, discharge recommendations, goals, PT role and benefits, precautions, weight-bearing education, HEP, disease specific education, general safety, functional mobility training, proper use of assistive device/equipment, transfer training  Learning Assessment:  patient verbalizes understanding, would benefit from continued reinforcement    Patient Disposition at end of session:  patient left in chair, chair alarm in place, call light within reach, gait belt, patient at risk for falls, nurse notified, and heels floated .    Plan   Frequency:5-7 /week   Current Treatment Recommendations: strengthening, ROM, balance training, functional mobility training, transfer training, gait training, stair training, endurance training, neuromuscular re-education, patient/caregiver education, and home exercise program    Goals   Patient Goals:  Patient states, \"I want to go to a skilled facility for a few weeks.\"      Goals :   To be met in by:4/26/25 unless noted otherwise:  1). Independent with LE Ex x 10 reps to maintain/improve AROM/strength met by 4/21/25. 4/17/25Pt required cues for LE therex.   2). Sit to/from stand:Modified Independent 4/17 patient demo up to min assist for Transfers with FWW. 4/18/25 Pt demo up to min assist with transfers for LLE management. : 4/20: CGA 4/21: CGA: 4/24: SBA with RW  3). Bed to chair: Modified Independent4/17 patient with walking even transfer distances requires up to max assist with FWW. 4/18/25 Pt demo up to min assist with transfers for LLE management. 4:21: MIN A for L LE management:   4). Gait: Ambulate 150 ft.Modified Independent and use of LRAD. 4/17 pt demo 10 feet x2 with mod to max assist with FWW. 4/18/25 Pt with WC follow KI lokce din ext able to walk up to 30 feet with chair follow and CGA to min assist with pain limiting. :  4/20: 40ft CGA with RW. 4/21: CGA with FWW 40 feet x 2; 4/24: 95ftx2 RW SBA  5.) Ascend/descend 15 steps with Modified Independent with use of hand rail unilateral and use of  LRAD  4/22 Pt demo up and down 1 4 inch curb step FWW up to Mod assist.       Above goals reviewed on 4/24/2025.  All goals are ongoing at this time unless indicated above.      Therapy Session Time      Second Session Therapy Time:   Individual Concurrent Group Co-treatment   Time In       1558   Time Out       1644   Minutes       46     PT:23  OT23     Timed Code Treatment Minutes:  23    Total Treatment Minutes:  23        Electronically Signed By: Vilma Draper, PT

## 2025-04-24 NOTE — PROGRESS NOTES
Pt in bed with immobilizer unhooked and ace wrap off. This nurse and nursing supervisor Yecenia assisted pt to wash LLE, new ace wrap and immobilizer reapplied. Pt tolerated well.

## 2025-04-24 NOTE — PROGRESS NOTES
Spiritual Health History and Assessment/Progress Note  [facility]    Initial Encounter,  ,  ,      Name: Argentina Barrios MRN: [id number]    Age: 39 y.o.     Sex: female   Language: English   Presybeterian: Unknown   Patellar tendon rupture, left, initial encounter     Date: 4/24/2025            Total Time Calculated: 1 min              Spiritual Assessment began in [facility] 3 PROGRESSIVE CARE        Referral/Consult From: Rounding   Encounter Overview/Reason: Initial Encounter  Service Provided For: Patient    Yaritza, Belief, Meaning:   Patient unable to assess at this time  Family/Friends No family/friends present      Importance and Influence:  Patient unable to assess at this time  Family/Friends No family/friends present    Community:  Patient Other: N/A  Family/Friends No family/friends present    Assessment and Plan of Care:     Patient Interventions include: Other: Offered SHS/no needs at this time  Family/Friends Interventions include: No family/friends present    Patient Plan of Care: Other: N/A  Family/Friends Plan of Care: No family/friends present    Electronically signed by VERNON Antunez on 4/24/2025 at 3:05 PM

## 2025-04-24 NOTE — PLAN OF CARE
Problem: Discharge Planning  Goal: Discharge to home or other facility with appropriate resources  4/23/2025 2025 by Bernice Ellsworth, RN  Outcome: Progressing  4/23/2025 1612 by Olive Wood, RN  Outcome: Progressing     Problem: Pain  Goal: Verbalizes/displays adequate comfort level or baseline comfort level  Outcome: Progressing     Problem: Safety - Adult  Goal: Free from fall injury  4/23/2025 2025 by Bernice Ellsworth, RN  Outcome: Progressing  4/23/2025 1611 by Olive Wood, RN  Outcome: Progressing     Problem: ABCDS Injury Assessment  Goal: Absence of physical injury  Outcome: Progressing

## 2025-04-24 NOTE — PROGRESS NOTES
Occupational Therapy    [facility] - Rehabilitation Department       Occupational Therapy  3221/3221-01  [facility] 3 PROGRESSIVE CARE    [] Initial Evaluation            [x] Daily Treatment Note         [] Discharge Summary      Patient: Argentina Barrios   : 1985   MRN: [id number]   Date of Service:  2025  Discharge Recommendations: Home with 24 hr assist vs SNF     AM-PAC Inpatient Daily Activity Raw Score: 20    DME Required For Discharge: rolling walker, tub transfer bench, grab bars - toilet, reacher, sock-aid, long-handle sponge 25: Discussed with patient and spouse recommended DME, spouse reporting they will start to obtain DME at this time and verbalized understanding    Therapy discharge recommendations take into account each patient's current medical complexities and are subject to input/oversight from the patient's healthcare team.     Barriers to Home Discharge:   [x] Steps to access home entry or bed/bath:   [x] Unable to transfer, ambulate, or propel wheelchair household distances without assist   [x] Limited available assist at home upon discharge    [x] Patient or family requests d/c to post-acute facility    [] Poor cognition/safety awareness for d/c to home alone    []Unable to maintain ordered weight bearing status    [] Patient with salient signs of long-standing immobility   [x] Patient is at risk for falls due to: impaired balance   [] Other:    If pt is unable to be seen after this session, please let this note serve as discharge summary.  Please see case management note for discharge disposition.  Thank you.  Admitting Diagnosis:  Patellar tendon rupture, left, initial encounter  Referring Physician: Marcus Hsu MD   Current Admission Summary: Marcus Hsu MD H&P :  \"DIAGNOSIS:  Left patella tendon rupture     CHIEF COMPLAINT:  Left knee pain and unable to ambulate     History Obtained From:  patient, electronic medical record     HISTORY OF  make arrangements for discharge planning after surgery is complete and once we have a physical therapy assessment.  Preoperative routine labs have been ordered.  Should be n.p.o. after midnight.  Will begin DVT prophylaxis on postoperative day #1.  For now we will utilize sequential compression devices for DVT prophylaxis.\"     Marcus Hsu MD Brief Op Note 4/15:  \"Date of Procedure: 4/15/2025     Pre-Op Diagnosis Codes:      * Patellar tendon rupture, left, initial encounter [S86.812A]     Post-Op Diagnosis: Same       Procedure(s):  LEFT PATELLA TENDON REPAIR     Surgeon(s):  Marcus Hsu MD\"    Past Medical History:  has a past medical history of ADHD (attention deficit hyperactivity disorder), Allergic rhinitis, Depression, and Vitamin D deficiency.  Past Surgical History:  has a past surgical history that includes  section (, ); laparotomy (2009); Tonsillectomy (1988); Hysterectomy; US BIOPSY OF SALIVARY GLAND (2024); and Patellar tendon repair (Left, 4/15/2025).  _______________________________________________________________________________________________________________________________________________________  Assessment  Impairments Requiring Therapeutic Intervention: decreased functional mobility, decreased ADL status, decreased ROM, decreased strength, decreased safety awareness, decreased endurance, decreased balance, decreased IADL, increased pain  Prognosis: good    Assessment/Clinical Assessment   Pt seen today for occupational therapy Treatment.   Pt demonstrated decreased Activity tolerance, Functional Mobility , Balance, Safety, and Strength as well as decreased independence with Activities of Daily Living, Functional Mobility, Functional Transfers,, and Instrumental Activities of Daily Living.   Recommending SNF upon discharge as patient functioning well below baseline, demonstrates good rehab potential and unable to return home due to limited

## 2025-04-24 NOTE — PROGRESS NOTES
Orthopaedics           Progress Note    Patient:  Argentina Barrios  YOB: 1985     39 y.o. female    Subjective:  Patient seen and examined  Pain improving, tolerating brace.  Resting comfortably in bed today.  Awaiting SNF/ARU    Objective:   Vitals:    04/24/25 0838   BP:    Pulse:    Resp: 16   Temp:    SpO2:      Gen: NAD, cooperative     Left lower extremity: Brace on locked in extension, wound clean and dry. Dressing in place. Compartments soft. EHL/FHL/TA/GS complex motor intact. Sural, saphenous, superificial/deep peroneal, and plantar nerve distribution sensation grossly intact. Dorsalis pedis/posterior tibial pulses 2+ with BCR.      Meds: Lovenox  See rec for complete list    Impression/plan: 39 y.o. female s/p L patellar tendon repair with Dr. Hsu,  (DOS 4/15/25)    1:  Weight bearing as tolerated with her knee brace locked in extension.  She can unlock the knee brace and passively bend the knee up to 30 degrees.  No active knee extension exercises and no straight leg raises x 6 weeks.   2:  Deep venous thrombosis prophylaxis -Lovenox while in the hospital.  She will transition to aspirin 81 mg twice daily at discharge  3:  Continue physical therapy and Occupational Therapy  4:  D/C Plan:  Skilled Nursing Facility, awaiting precert  5:  Pain Control: Continue current pain management.  6: Follow-up with Dr. Hsu 2 weeks after surgery and we will increase her flexion to 60 degrees passively at that point with the goal of achieving 90 degrees of passive flexion by 6 weeks and she can then begin active knee extension and active quad exercises at that point.        KANDY Guajardo    1:37 PM 4/24/2025

## 2025-04-24 NOTE — CARE COORDINATION
Discharge Planning Note:     Spoke with Nidia @ [facility] and pre-cert remains pending. Inga was able to get a copy of the pts [facility] insurance card with start and end dates and presented to [facility]. Waiting on [facility] response.      1413- Spoke with Nidia @ [facility] and pre-cert still pending.

## 2025-04-25 VITALS
DIASTOLIC BLOOD PRESSURE: 65 MMHG | TEMPERATURE: 98.6 F | HEART RATE: 68 BPM | BODY MASS INDEX: 29.82 KG/M2 | SYSTOLIC BLOOD PRESSURE: 118 MMHG | HEIGHT: 67 IN | OXYGEN SATURATION: 100 % | RESPIRATION RATE: 17 BRPM | WEIGHT: 190 LBS

## 2025-04-25 PROCEDURE — 2500000003 HC RX 250 WO HCPCS: Performed by: ORTHOPAEDIC SURGERY

## 2025-04-25 PROCEDURE — 6360000002 HC RX W HCPCS: Performed by: STUDENT IN AN ORGANIZED HEALTH CARE EDUCATION/TRAINING PROGRAM

## 2025-04-25 PROCEDURE — 6370000000 HC RX 637 (ALT 250 FOR IP): Performed by: STUDENT IN AN ORGANIZED HEALTH CARE EDUCATION/TRAINING PROGRAM

## 2025-04-25 PROCEDURE — 6360000002 HC RX W HCPCS: Performed by: ORTHOPAEDIC SURGERY

## 2025-04-25 RX ADMIN — DIPHENHYDRAMINE HYDROCHLORIDE 25 MG: 50 INJECTION, SOLUTION INTRAMUSCULAR; INTRAVENOUS at 06:54

## 2025-04-25 RX ADMIN — OXYCODONE HYDROCHLORIDE AND ACETAMINOPHEN 1 TABLET: 5; 325 TABLET ORAL at 06:55

## 2025-04-25 RX ADMIN — SODIUM CHLORIDE, PRESERVATIVE FREE 10 ML: 5 INJECTION INTRAVENOUS at 09:29

## 2025-04-25 RX ADMIN — HYDROMORPHONE HYDROCHLORIDE 0.5 MG: 1 INJECTION, SOLUTION INTRAMUSCULAR; INTRAVENOUS; SUBCUTANEOUS at 09:25

## 2025-04-25 RX ADMIN — OXYCODONE HYDROCHLORIDE AND ACETAMINOPHEN 1 TABLET: 10; 325 TABLET ORAL at 11:44

## 2025-04-25 RX ADMIN — HYDROMORPHONE HYDROCHLORIDE 0.5 MG: 1 INJECTION, SOLUTION INTRAMUSCULAR; INTRAVENOUS; SUBCUTANEOUS at 02:58

## 2025-04-25 RX ADMIN — HYDROMORPHONE HYDROCHLORIDE 0.5 MG: 1 INJECTION, SOLUTION INTRAMUSCULAR; INTRAVENOUS; SUBCUTANEOUS at 13:38

## 2025-04-25 RX ADMIN — DOCUSATE SODIUM 100 MG: 100 CAPSULE, LIQUID FILLED ORAL at 09:24

## 2025-04-25 RX ADMIN — ENOXAPARIN SODIUM 40 MG: 100 INJECTION SUBCUTANEOUS at 09:24

## 2025-04-25 ASSESSMENT — PAIN SCALES - GENERAL
PAINLEVEL_OUTOF10: 8
PAINLEVEL_OUTOF10: 8
PAINLEVEL_OUTOF10: 7
PAINLEVEL_OUTOF10: 6
PAINLEVEL_OUTOF10: 7
PAINLEVEL_OUTOF10: 8

## 2025-04-25 ASSESSMENT — PAIN DESCRIPTION - PAIN TYPE: TYPE: SURGICAL PAIN

## 2025-04-25 ASSESSMENT — PAIN DESCRIPTION - LOCATION
LOCATION: LEG

## 2025-04-25 ASSESSMENT — PAIN DESCRIPTION - DESCRIPTORS
DESCRIPTORS: ACHING;THROBBING
DESCRIPTORS: THROBBING
DESCRIPTORS: ACHING;THROBBING

## 2025-04-25 ASSESSMENT — PAIN DESCRIPTION - ORIENTATION
ORIENTATION: LEFT

## 2025-04-25 ASSESSMENT — PAIN - FUNCTIONAL ASSESSMENT: PAIN_FUNCTIONAL_ASSESSMENT: PREVENTS OR INTERFERES WITH ALL ACTIVE AND SOME PASSIVE ACTIVITIES

## 2025-04-25 ASSESSMENT — PAIN DESCRIPTION - ONSET: ONSET: ON-GOING

## 2025-04-25 NOTE — CARE COORDINATION
Patient noted to have a discharge order.  Pt has been medically cleared for transition to Skilled Nursing Rehab Facility    Patient discharged to   [facility]  [address] [place]  [address]       HENS Completed:  Y  Pre-cert required/obtained:  Y    Transportation scheduled for 1400  Transportation provided by [employer]  AVS faxed and agency notified:  Y    Family Notified:  by patient    Nurse to call report to facility  Phone: [phone]  Fax: [phone]  eFax: [phone]

## 2025-04-25 NOTE — PLAN OF CARE
Problem: Discharge Planning  Goal: Discharge to home or other facility with appropriate resources  4/24/2025 2220 by Adela Aiken RN  Outcome: Progressing  4/24/2025 1855 by Janell Francis RN  Outcome: Progressing  Flowsheets (Taken 4/24/2025 0827)  Discharge to home or other facility with appropriate resources: Identify barriers to discharge with patient and caregiver     Problem: Pain  Goal: Verbalizes/displays adequate comfort level or baseline comfort level  4/24/2025 2220 by Adela Aiken RN  Outcome: Progressing  4/24/2025 1855 by Janell Francis RN  Outcome: Progressing     Problem: Safety - Adult  Goal: Free from fall injury  4/24/2025 2220 by Adela Aiken RN  Outcome: Progressing  4/24/2025 1855 by Janell Francis RN  Outcome: Progressing     Problem: ABCDS Injury Assessment  Goal: Absence of physical injury  4/24/2025 2220 by Adela Aiken RN  Outcome: Progressing  4/24/2025 1855 by Janell Francis RN  Outcome: Progressing  Flowsheets (Taken 4/24/2025 0827)  Absence of Physical Injury: Implement safety measures based on patient assessment     Problem: Skin/Tissue Integrity  Goal: Skin integrity remains intact  Description: 1.  Monitor for areas of redness and/or skin breakdown2.  Assess vascular access sites hourly3.  Every 4-6 hours minimum:  Change oxygen saturation probe site4.  Every 4-6 hours:  If on nasal continuous positive airway pressure, respiratory therapy assess nares and determine need for appliance change or resting period  Outcome: Progressing

## 2025-04-25 NOTE — PLAN OF CARE
Problem: Discharge Planning  Goal: Discharge to home or other facility with appropriate resources  4/25/2025 1031 by Jing Phoenix RN  Outcome: Progressing  Flowsheets (Taken 4/25/2025 0924)  Discharge to home or other facility with appropriate resources:   Identify barriers to discharge with patient and caregiver   Arrange for needed discharge resources and transportation as appropriate  4/24/2025 2220 by Adela Aiken RN  Outcome: Progressing     Problem: Pain  Goal: Verbalizes/displays adequate comfort level or baseline comfort level  4/25/2025 1031 by Jing Phoenix RN  Outcome: Progressing  4/24/2025 2220 by Adela Aiken RN  Outcome: Progressing     Problem: Safety - Adult  Goal: Free from fall injury  4/25/2025 1031 by Jing Phoenix RN  Outcome: Progressing  4/24/2025 2220 by Adela Aiken RN  Outcome: Progressing     Problem: ABCDS Injury Assessment  Goal: Absence of physical injury  4/25/2025 1031 by Jing Phoenix RN  Outcome: Progressing  Flowsheets (Taken 4/25/2025 0924)  Absence of Physical Injury: Implement safety measures based on patient assessment  4/24/2025 2220 by Adela Aiken RN  Outcome: Progressing     Problem: Skin/Tissue Integrity  Goal: Skin integrity remains intact  Description: 1.  Monitor for areas of redness and/or skin breakdown2.  Assess vascular access sites hourly3.  Every 4-6 hours minimum:  Change oxygen saturation probe site4.  Every 4-6 hours:  If on nasal continuous positive airway pressure, respiratory therapy assess nares and determine need for appliance change or resting period  4/25/2025 1031 by Jing Phoenix RN  Outcome: Progressing  Flowsheets (Taken 4/25/2025 0924)  Skin Integrity Remains Intact:   Monitor for areas of redness and/or skin breakdown   Assess vascular access sites hourly  4/24/2025 2220 by Adela Aiken RN  Outcome: Progressing

## 2025-04-25 NOTE — PROGRESS NOTES
Report called to [facility]. Patient d/c with all belongings, VSS, all questions answered and sent with AVS

## 2025-04-28 DIAGNOSIS — Z98.890 S/P LEFT KNEE SURGERY: ICD-10-CM

## 2025-04-28 DIAGNOSIS — S86.812A PATELLAR TENDON RUPTURE, LEFT, INITIAL ENCOUNTER: Primary | ICD-10-CM

## 2025-04-28 RX ORDER — OXYCODONE HYDROCHLORIDE 5 MG/1
5-10 TABLET ORAL EVERY 6 HOURS PRN
Qty: 48 TABLET | Refills: 0 | Status: SHIPPED | OUTPATIENT
Start: 2025-04-28 | End: 2025-05-05

## 2025-04-28 RX ORDER — POLYETHYLENE GLYCOL 3350 17 G/17G
17 POWDER, FOR SOLUTION ORAL DAILY PRN
Qty: 30 PACKET | Refills: 0 | Status: SHIPPED | OUTPATIENT
Start: 2025-04-28 | End: 2025-05-01

## 2025-04-28 NOTE — TELEPHONE ENCOUNTER
Prescription Refill     Medication Name:  STOOL SOFTENER   Pharmacy: [facility] [id number] [place] [id number] - P [phone] - F [phone]   Patient Contact Number: [phone]

## 2025-04-28 NOTE — TELEPHONE ENCOUNTER
General Question     Subject: HOME PT ORDERS   Patient and /or Facility Request: Argentina Barrios   Contact Number: [phone]     PATIENT REQUESTING TO SPEAK WITH SOMEONE REGARDING HER LAST EXPERIENCE AT REHAB AND TO GET ORDERS FOR HOME PT ORDERS

## 2025-04-28 NOTE — TELEPHONE ENCOUNTER
I have put in an order for home health. She found a ride for appointment for Thursday. She needs a refill of oxycodone and stool softner. She has been without all weekend.

## 2025-04-28 NOTE — TELEPHONE ENCOUNTER
Prescription Refill     Medication Name:  OXYCODONE   Pharmacy: [facility] [id number] [place] [id number] - P [phone] - F [phone]   Patient Contact Number:  [phone]

## 2025-04-28 NOTE — TELEPHONE ENCOUNTER
Called patient. She was sent to a nursing facility but she did not want to stay there. She went home. She is in need of at home care. She also did not have a post op scheduled. Scheduled her a post op. She needs transportation to post op. Looking into roundtrip for her.

## 2025-04-29 ENCOUNTER — TELEPHONE (OUTPATIENT)
Age: 40
End: 2025-04-29

## 2025-04-29 NOTE — TELEPHONE ENCOUNTER
Patient Called and had some PO questions. She is struggling to unlock the brace - I instructed her on what the brace needs to be unlocked however she cannot find the unlock button I will send her a picture to show her what the unlock button looks like. She is also in a lot of pain and I instructed her to keep moving the ankle and to continue icing. Gave her the number for [facility].    We see her on 5/1/25     Sending her a picture of the brace with the unlock feature.

## 2025-04-30 ENCOUNTER — TELEPHONE (OUTPATIENT)
Age: 40
End: 2025-04-30

## 2025-04-30 NOTE — TELEPHONE ENCOUNTER
----- Message from Kimberlee PRICE sent at 4/30/2025  3:56 PM EDT -----  Regarding: Specialty Message to Provider  Specialty Message to Provider    Relationship to Patient: Covered Entity [facility]  Patient Message: THEY WANTED TO LET DR LUKE KNOW THAT THEY WILL BE STARTING HER HOMECARE TOMORROW OR FRIDAY.  --------------------------------------------------------------------------------------------------------------------------    Call Back Information: OK to leave message on voicemail it's secure  Preferred Call Back Number: [phone]

## 2025-05-01 ENCOUNTER — OFFICE VISIT (OUTPATIENT)
Age: 40
End: 2025-05-01

## 2025-05-01 VITALS — WEIGHT: 190 LBS | HEIGHT: 67 IN | BODY MASS INDEX: 29.82 KG/M2

## 2025-05-01 DIAGNOSIS — S86.812D RUPTURE OF LEFT PATELLAR TENDON, SUBSEQUENT ENCOUNTER: Primary | ICD-10-CM

## 2025-05-01 DIAGNOSIS — M25.561 ACUTE PAIN OF RIGHT KNEE: ICD-10-CM

## 2025-05-01 DIAGNOSIS — Z98.890 S/P LEFT KNEE SURGERY: ICD-10-CM

## 2025-05-01 NOTE — PROGRESS NOTES
Argentina Barrios  [id number]  Encounter Date: 5/1/2025  YOB: 1985    Chief Complaint   Patient presents with    Post-Op Check     L Patella tendon repair DOS 4/15/25       History:Ms. Argentina Barrios is here in follow up   History of Present Illness  The patient is here for a 2-week follow-up on her left patella tendon repair. She was initially discharged to a skilled care facility but left after 1 day. She is using her brace with a range of motion lock between 0 and 30 degrees.    Significant pressure sensation in the right knee is reported, attributed to the use of a walker and trying to ambulate. No occupational therapy or physical therapy sessions have been initiated. Additionally, she has not yet attempted to shower at home.      Exam:  Ht 1.702 m (5' 7\")   Wt 86.2 kg (190 lb) Comment: Patient reported  LMP 12/25/2012   BMI 29.76 kg/m²   General: Alert and oriented x3, No acute distress, Cooperative and conversant.  Obesity Absent   Mood and affect are appropriate.  Left knee : Incision area is well-healed.  Soft tissue swelling is improving.  She has a lot of apprehension with range of motion but I am able to get her to relax and allow the knee to passively flex to about 40 degrees.  And I can actively extend her to neutral.  With her brace locked she is able to form a quad contraction with mild to moderate discomfort.  Calf is soft with negative Homans' sign good functional range of motion of her left ankle.  Sensation to light touch grossly present throughout the left lower extremity  Capillary refill < 2 seconds and palpable distal pulses  Skin: no erythema, lesions or rashes  No signs / symptoms of DVT / PE or infection    Right knee: No swelling or effusion.  No erythema.  No tenderness along the joint line medially or laterally.  She does have some mild tenderness in the patella tendon area without palpable defect.  Trace popping on flexion and extension with quad strength 4+/5.  No gross

## 2025-05-05 ENCOUNTER — TELEPHONE (OUTPATIENT)
Age: 40
End: 2025-05-05

## 2025-05-05 NOTE — TELEPHONE ENCOUNTER
----- Message from Maricarmen S sent at 5/2/2025  4:30 PM EDT -----  Regarding: Specialty Message to Provider  Specialty Message to Provider    Relationship to Patient: Self     Patient Message: REQUESTING WORK NOTE BE FAXED TO: [phone]  --------------------------------------------------------------------------------------------------------------------------    Call Back Information: OK to leave message on voicemail  Preferred Call Back Number: [phone]

## 2025-05-05 NOTE — TELEPHONE ENCOUNTER
----- Message from Kenya MOORE sent at 5/5/2025  8:52 AM EDT -----  Regarding: FW: Specialty Message to Provider    ----- Message -----  From: Maricarmen Diane  Sent: 5/2/2025   4:31 PM EDT  To: [facility] Clinical Staff  Subject: Specialty Message to Provider                    Specialty Message to Provider    Relationship to Patient: Self     Patient Message: REQUESTING WORK NOTE BE FAXED TO: [phone]  --------------------------------------------------------------------------------------------------------------------------    Call Back Information: OK to leave message on voicemail  Preferred Call Back Number: [phone]

## 2025-05-05 NOTE — TELEPHONE ENCOUNTER
Called patient. She just needed her Leave of Absence form refaxed because they did not receive it. Refaxed. Received confirmation that it went through.

## 2025-05-19 ENCOUNTER — OFFICE VISIT (OUTPATIENT)
Age: 40
End: 2025-05-19

## 2025-05-19 VITALS — BODY MASS INDEX: 29.82 KG/M2 | HEIGHT: 67 IN | WEIGHT: 190 LBS

## 2025-05-19 DIAGNOSIS — S86.812D PATELLAR TENDON RUPTURE, LEFT, SUBSEQUENT ENCOUNTER: Primary | ICD-10-CM

## 2025-05-19 PROCEDURE — 99024 POSTOP FOLLOW-UP VISIT: CPT | Performed by: ORTHOPAEDIC SURGERY

## 2025-05-19 RX ORDER — OXYCODONE HYDROCHLORIDE 5 MG/1
5 TABLET ORAL EVERY 6 HOURS PRN
Qty: 28 TABLET | Refills: 0 | Status: SHIPPED | OUTPATIENT
Start: 2025-05-19 | End: 2025-05-26

## 2025-05-19 NOTE — PROGRESS NOTES
Argentina Barrios  [id number]  Encounter Date: 5/19/2025  YOB: 1985    Chief Complaint   Patient presents with    Post-Op Check     L Patella Tendon Repair, DOS 4/15/25       History:Ms. Argentina Barrios is here in follow up on her left patella tendon repair of a mid-substance rupture.  She is now 4 weeks post-op.  She still has moderate pain in the knee and is apprehensive about any motion.  Swelling has improved and incision has not had any drainage.  She is using her knee brace locked in extension for mobilizing and has flexion unlocked to 30 degrees to allow for passive flexion.  She has been instructed to avoid active knee extension or straight leg raises for total of 6 weeks after surgery.    Exam:  Ht 1.702 m (5' 7\")   Wt 86.2 kg (190 lb)   LMP 12/25/2012   BMI 29.76 kg/m²   General: Alert and oriented x3, No acute distress, Cooperative and conversant.  Obesity absent  Mood and affect are appropriate.  Left knee: Incision area is well-healed.  Soft tissue swelling resolving compared to last visit.  Calf is soft with negative Homans' sign.  She tolerates passive flexion to about 40 degrees with a lot of apprehension.  In the seated position with her leg extended I am able to get her to start firing her quadricep weakly.  Gross motor function to her ankle intact.  Sensation to light touch grossly present throughout the left lower extremity  Capillary refill < 2 seconds and palpable distal pulses  Skin: no erythema, lesions or rashes  No signs / symptoms of DVT / PE or infection      Assessment:   Diagnosis Orders   1. Patellar tendon rupture, left, subsequent encounter  oxyCODONE (ROXICODONE) 5 MG immediate release tablet        Plan:  Brace opened up to allow 70 degrees of flexion passively.  Continue to ambulate with the brace locked in extension weightbearing as tolerated.  Continue to hold on straight leg raises and active knee extension exercises for 3 more weeks.  Follow-up in 3 weeks and we

## 2025-05-22 ENCOUNTER — TELEPHONE (OUTPATIENT)
Age: 40
End: 2025-05-22

## 2025-05-22 NOTE — TELEPHONE ENCOUNTER
PATIENT IS REQUESTING SCRIPT FOR   HANDICAP PLACARD.    IF POSSIBLE WOULD LIKE TODAY    PLEASE CALL PATIENT TO DISCUSS

## 2025-05-22 NOTE — TELEPHONE ENCOUNTER
Spoke with patient about getting a handicap placard. We will get a handicap placard for her and JDA will sign it.

## 2025-05-28 ENCOUNTER — PATIENT MESSAGE (OUTPATIENT)
Age: 40
End: 2025-05-28

## 2025-05-28 DIAGNOSIS — S86.812A PATELLAR TENDON RUPTURE, LEFT, INITIAL ENCOUNTER: Primary | ICD-10-CM

## 2025-05-28 NOTE — TELEPHONE ENCOUNTER
Should we send her to outpatient PT? Sounds like some dry needling and some easy manips could help to get her going a little more?

## 2025-05-29 DIAGNOSIS — Z98.890 S/P LEFT KNEE SURGERY: ICD-10-CM

## 2025-05-29 DIAGNOSIS — S86.812D PATELLAR TENDON RUPTURE, LEFT, SUBSEQUENT ENCOUNTER: Primary | ICD-10-CM

## 2025-05-29 RX ORDER — OXYCODONE HYDROCHLORIDE 5 MG/1
5-10 TABLET ORAL EVERY 6 HOURS PRN
Qty: 48 TABLET | Refills: 0 | Status: SHIPPED | OUTPATIENT
Start: 2025-05-29 | End: 2025-06-05

## 2025-05-29 NOTE — TELEPHONE ENCOUNTER
Goal is 90 degrees by 12 weeks which is July 14. Brace was unlocked to allow more passive motion.  She will be able to start active knee extension and straight leg raise exercises next week which can help normalize how the knee is functioning.  Travel is her largest barrier to outpatient PT, but if she is willing to go 2 time per week may be beneficial.  At least since it is her left knee she could theoretically resume driving herself as long as she is not taking narcotics before getting behind the wheel.

## 2025-06-09 ENCOUNTER — OFFICE VISIT (OUTPATIENT)
Age: 40
End: 2025-06-09

## 2025-06-09 VITALS — HEIGHT: 67 IN | BODY MASS INDEX: 29.82 KG/M2 | WEIGHT: 190 LBS

## 2025-06-09 DIAGNOSIS — S86.812D PATELLAR TENDON RUPTURE, LEFT, SUBSEQUENT ENCOUNTER: Primary | ICD-10-CM

## 2025-06-09 PROCEDURE — 99024 POSTOP FOLLOW-UP VISIT: CPT | Performed by: ORTHOPAEDIC SURGERY

## 2025-06-09 NOTE — PROGRESS NOTES
Argentina Barrios  [id number]  Encounter Date: 6/9/2025  YOB: 1985    Chief Complaint   Patient presents with    Post-Op Check     L Patella tendon repair, DOS 4/15/25, Started outpatient PT.        History:Ms. Argentina Barrios is here in follow up approaching 8 weeks postop on left patella tendon repair.  She is making slow progress and just started outpatient physical therapy.  She still has her hinged knee brace with the flexion locked set at 90 degrees today.  She has been avoiding active knee extension and straight leg raises as recommended.  Pain levels continue to improve but she is still working hard to regain flexion.  Denies any numbness or tingling down the leg.  No issues with her wound healing.     Exam:  Ht 1.702 m (5' 7\")   Wt 86.2 kg (190 lb)   LMP 12/25/2012   BMI 29.76 kg/m²   General: Alert and oriented x3, No acute distress, Cooperative and conversant.  Obesity absent  Mood and affect are appropriate.  Left knee: Anterior incision remains well-healed.  Palpation over the surgical site shows no palpable gaps.  I can hold her straight leg raise and she can start to fire her quad although her strength is still 2/5.  Gross motor function to her ankle intact.  She tolerates flexion to 70 degrees in the seated position in the office today.  Sensation to light touch grossly present throughout the left lower extremity  Capillary refill < 2 seconds and palpable distal pulses    Assessment:   Diagnosis Orders   1. Patellar tendon rupture, left, subsequent encounter            Plan:  Should continue working with outpatient physical therapy.  The work on electro stim to the muscle and active and passive range of motion to the knee now.  She is cleared for active knee extension and straight leg raise but will still need assistance due to her weakness.  She will continue ambulating weightbearing as tolerated and start weaning out of her brace.  She is cleared to flex past 90 degrees as she

## 2025-07-11 ENCOUNTER — PATIENT MESSAGE (OUTPATIENT)
Age: 40
End: 2025-07-11

## 2025-07-14 ENCOUNTER — OFFICE VISIT (OUTPATIENT)
Age: 40
End: 2025-07-14

## 2025-07-14 VITALS — BODY MASS INDEX: 29.82 KG/M2 | HEIGHT: 67 IN | WEIGHT: 190 LBS

## 2025-07-14 DIAGNOSIS — S86.812D PATELLAR TENDON RUPTURE, LEFT, SUBSEQUENT ENCOUNTER: Primary | ICD-10-CM

## 2025-07-14 RX ORDER — HYDROCODONE BITARTRATE AND ACETAMINOPHEN 5; 325 MG/1; MG/1
1 TABLET ORAL EVERY 6 HOURS PRN
Qty: 28 TABLET | Refills: 0 | Status: SHIPPED | OUTPATIENT
Start: 2025-07-14 | End: 2025-07-21

## 2025-07-14 NOTE — PROGRESS NOTES
improvement of the AI technology. The patient (or guardian, if applicable) and other individuals in attendance at the appointment consented to the use of AI, including the recording.

## 2025-07-15 NOTE — TELEPHONE ENCOUNTER
Re-faxed the completed fmla for caregiver to [employer] at [phone] and re-faxed the completed CAMILLE to [facility] at [phone]

## 2025-08-11 ENCOUNTER — TELEPHONE (OUTPATIENT)
Age: 40
End: 2025-08-11

## 2025-08-18 ENCOUNTER — OFFICE VISIT (OUTPATIENT)
Age: 40
End: 2025-08-18

## 2025-08-18 VITALS — HEIGHT: 67 IN | BODY MASS INDEX: 28.88 KG/M2 | WEIGHT: 184 LBS

## 2025-08-18 DIAGNOSIS — S86.812D PATELLAR TENDON RUPTURE, LEFT, SUBSEQUENT ENCOUNTER: Primary | ICD-10-CM

## 2025-08-28 ENCOUNTER — TELEPHONE (OUTPATIENT)
Dept: ORTHOPEDIC SURGERY | Age: 40
End: 2025-08-28

## 2025-08-28 ENCOUNTER — TELEPHONE (OUTPATIENT)
Age: 40
End: 2025-08-28

## 2025-09-03 ENCOUNTER — TELEPHONE (OUTPATIENT)
Age: 40
End: 2025-09-03

## (undated) DEVICE — GAUZE,SPONGE,4"X4",8PLY,STRL,LF,10/TRAY: Brand: MEDLINE

## (undated) DEVICE — 4-PORT MANIFOLD: Brand: NEPTUNE 2

## (undated) DEVICE — MEDICINE CUP, GRADUATED, STER: Brand: MEDLINE

## (undated) DEVICE — SOLUTION IRRIG 500ML 0.9% SOD CHLO USP POUR PLAS BTL

## (undated) DEVICE — SUTURE VICRYL + SZ 2-0 L18IN ABSRB UD CT1 L36MM 1/2 CIR VCP839D

## (undated) DEVICE — SUTURE SUTTAPE L40IN DIA1.3MM NONABSORBABLE WHT BLU L26.5MM AR7500

## (undated) DEVICE — SPONGE LAP W18XL18IN WHT COT 4 PLY FLD STRUNG RADPQ DISP ST 2 PER PACK

## (undated) DEVICE — 3M™ STERI-DRAPE™ U-DRAPE 1015: Brand: STERI-DRAPE™

## (undated) DEVICE — 3M™ COBAN™ NL STERILE NON-LATEX SELF-ADHERENT WRAP, 2084S, 4 IN X 5 YD (10 CM X 4,5 M), 18 ROLLS/CASE: Brand: 3M™ COBAN™

## (undated) DEVICE — MICRO SAGITTAL BLADE (9.5 X 0.4 X 25.5MM)

## (undated) DEVICE — T-DRAPE,EXTREMITY,STERILE: Brand: MEDLINE

## (undated) DEVICE — Device

## (undated) DEVICE — PADDING CAST W4INXL4YD ST COT RAYON MICROPLEATED HIGHLY

## (undated) DEVICE — SKIN MARKER,REGULAR TIP WITH RULER: Brand: DEVON

## (undated) DEVICE — SUTURE VICRYL + SZ 2-0 L36IN ABSRB UD L36MM CT-1 1/2 CIR VCP945H

## (undated) DEVICE — SUTURE MONOCRYL SZ 3 0 L18IN ABSRB UD PS 2 3 8 CIR REV CUT NDL MCP497G

## (undated) DEVICE — STRIP,CLOSURE,WOUND,MEDI-STRIP,1/2X4: Brand: MEDLINE

## (undated) DEVICE — INTENDED FOR TISSUE SEPARATION, AND OTHER PROCEDURES THAT REQUIRE A SHARP SURGICAL BLADE TO PUNCTURE OR CUT.: Brand: BARD-PARKER ® STAINLESS STEEL BLADES

## (undated) DEVICE — KIT SUTURE STRL LF DISP FIBERTAK

## (undated) DEVICE — DRESSING,GAUZE,XEROFORM,CURAD,5"X9",ST: Brand: CURAD

## (undated) DEVICE — STANDARD HYPODERMIC NEEDLE,POLYPROPYLENE HUB: Brand: MONOJECT

## (undated) DEVICE — DRAPE,U/ SHT,SPLIT,PLAS,STERIL: Brand: MEDLINE

## (undated) DEVICE — PRECISION THIN (9.0 X 0.38 X 31.0MM)

## (undated) DEVICE — APPLICATOR MEDICATED 26 CC SOLUTION HI LT ORNG CHLORAPREP

## (undated) DEVICE — DRESSING WND 4X8 IN ANTIMICROBIAL CONTACT SYS THERABOND 3D

## (undated) DEVICE — BANDAGE COMPR W6INXL12FT SMOOTH FOR LIMB EXSANG ESMARCH

## (undated) DEVICE — SUTURE TIGERLOOP SZ 2-0 L20IN NONABSORBABLE WHT GRN BRAID AR7234T

## (undated) DEVICE — YANKAUER,BULB TIP,W/O VENT,RIGID,STERILE: Brand: MEDLINE

## (undated) DEVICE — BRACE KNEE POSTOP COOL PROCARE TROM ADV

## (undated) DEVICE — STOCKINETTE,IMPERVIOUS,12X48,STERILE: Brand: MEDLINE

## (undated) DEVICE — 2.4 MM X 15 INCH DRILL TIP PASSING                                    PIN, STERILE: Brand: ENDOBUTTON

## (undated) DEVICE — GLOVE ORANGE PI 8 1/2   MSG9085

## (undated) DEVICE — FLUID TRAP FOR MINIVAC ES EQUIP FLD TRAP

## (undated) DEVICE — GLOVE SURG SZ 8.5 L11.85IN FNGR THK9.8MIL STRW LTX POLYMER